# Patient Record
Sex: FEMALE | Race: WHITE | NOT HISPANIC OR LATINO | Employment: FULL TIME | URBAN - METROPOLITAN AREA
[De-identification: names, ages, dates, MRNs, and addresses within clinical notes are randomized per-mention and may not be internally consistent; named-entity substitution may affect disease eponyms.]

---

## 2020-10-04 ENCOUNTER — HOSPITAL ENCOUNTER (OUTPATIENT)
Dept: MRI IMAGING | Facility: CLINIC | Age: 25
Discharge: HOME | End: 2020-10-04
Attending: INTERNAL MEDICINE | Admitting: INTERNAL MEDICINE
Payer: COMMERCIAL

## 2020-10-04 DIAGNOSIS — K83.01 PSC (PRIMARY SCLEROSING CHOLANGITIS) (H): ICD-10-CM

## 2020-10-04 PROCEDURE — 74183 MRI ABD W/O CNTR FLWD CNTR: CPT

## 2020-10-04 PROCEDURE — A9585 GADOBUTROL INJECTION: HCPCS | Performed by: INTERNAL MEDICINE

## 2020-10-04 PROCEDURE — 255N000002 HC RX 255 OP 636: Performed by: INTERNAL MEDICINE

## 2020-10-04 RX ORDER — GADOBUTROL 604.72 MG/ML
10 INJECTION INTRAVENOUS ONCE
Status: COMPLETED | OUTPATIENT
Start: 2020-10-04 | End: 2020-10-04

## 2020-10-04 RX ADMIN — GADOBUTROL 10 ML: 604.72 INJECTION INTRAVENOUS at 14:06

## 2022-01-17 ENCOUNTER — HOSPITAL ENCOUNTER (OUTPATIENT)
Dept: MRI IMAGING | Facility: CLINIC | Age: 27
Discharge: HOME | End: 2022-01-17
Attending: INTERNAL MEDICINE | Admitting: INTERNAL MEDICINE
Payer: COMMERCIAL

## 2022-01-17 DIAGNOSIS — K83.01 PSC (PRIMARY SCLEROSING CHOLANGITIS) (H): ICD-10-CM

## 2022-01-17 PROCEDURE — 74183 MRI ABD W/O CNTR FLWD CNTR: CPT

## 2022-01-17 PROCEDURE — A9585 GADOBUTROL INJECTION: HCPCS | Performed by: INTERNAL MEDICINE

## 2022-01-17 PROCEDURE — 255N000002 HC RX 255 OP 636: Performed by: INTERNAL MEDICINE

## 2022-01-17 RX ORDER — GADOBUTROL 604.72 MG/ML
6 INJECTION INTRAVENOUS ONCE
Status: COMPLETED | OUTPATIENT
Start: 2022-01-17 | End: 2022-01-17

## 2022-01-17 RX ADMIN — GADOBUTROL 6 ML: 604.72 INJECTION INTRAVENOUS at 09:05

## 2022-01-30 ENCOUNTER — HEALTH MAINTENANCE LETTER (OUTPATIENT)
Age: 27
End: 2022-01-30

## 2022-09-24 ENCOUNTER — HEALTH MAINTENANCE LETTER (OUTPATIENT)
Age: 27
End: 2022-09-24

## 2022-11-30 LAB
ABO (EXTERNAL): NORMAL
HEPATITIS B SURFACE ANTIGEN (EXTERNAL): NEGATIVE
HIV1+2 AB SERPL QL IA: NONREACTIVE
RH (EXTERNAL): POSITIVE
RUBELLA ANTIBODY IGG (EXTERNAL): NORMAL
TREPONEMA PALLIDUM ANTIBODY (EXTERNAL): NONREACTIVE
VDRL (SYPHILIS) (EXTERNAL): NONREACTIVE

## 2023-04-08 ENCOUNTER — NURSE TRIAGE (OUTPATIENT)
Dept: NURSING | Facility: CLINIC | Age: 28
End: 2023-04-08

## 2023-04-09 NOTE — TELEPHONE ENCOUNTER
Patient reports the carbon monoxide detector went off in her kitchen and she opened the windows and is standing on her porch.  She denies all symptoms.      Disposition is to call local agency like fire department to come and measure levels and reset her alarm. Patient verbalizes understanding and will let kitchen air out a bit and reset alarm.    Makayla Barton RN  [facility]      Reason for Disposition    [1] SUSPECTED CO EXPOSURE (e.g., CO alarm sounded) AND [2] asymptomatic now (no CO poisoning symptoms)    Additional Information    Negative: [1] Breathing stopped AND [2] hasn't returned    Negative: Bluish (or gray) lips or face now    Negative: Difficult to awaken or acting confused (e.g., disoriented, slurred speech)    Negative: Difficulty breathing    Negative: Chest pain or heaviness (present now)    Negative: Seizure    Negative: Patient attempted suicide    Negative: Sounds like a life-threatening emergency to the triager    Negative: [1] KNOWN CO EXPOSURE (e.g., other victims with CO poisoning) within past 24 hours AND [2] CO poisoning symptoms present now    Negative: [1] SUSPECTED CO EXPOSURE (e.g., CO alarm sounded) within past 24 hours AND [2] CO poisoning symptoms present now    Negative: [1] KNOWN CO EXPOSURE (e.g., other victims with CO poisoning) within past 24 hours AND [2] any CO poisoning symptoms since exposure (but asymptomatic now)    Negative: Triager unable to answer question    Negative: Patient sounds very sick or weak to the triager    Negative: [1] KNOWN CO EXPOSURE (e.g., other victims with CO poisoning) more than 24 hours ago AND [2] CO poisoning symptoms present now    Negative: [1] SUSPECTED CO EXPOSURE (e.g., CO alarm sounded) more than 24 hours agoAND [2] CO poisoning symptoms present now    Protocols used: CARBON MONOXIDE EXPOSURE-A-

## 2023-05-08 ENCOUNTER — HEALTH MAINTENANCE LETTER (OUTPATIENT)
Age: 28
End: 2023-05-08

## 2023-05-27 LAB — GROUP B STREPTOCOCCUS (EXTERNAL): NEGATIVE

## 2023-06-16 ENCOUNTER — HOSPITAL ENCOUNTER (OUTPATIENT)
Facility: CLINIC | Age: 28
End: 2023-06-16
Attending: STUDENT IN AN ORGANIZED HEALTH CARE EDUCATION/TRAINING PROGRAM | Admitting: STUDENT IN AN ORGANIZED HEALTH CARE EDUCATION/TRAINING PROGRAM
Payer: COMMERCIAL

## 2023-07-23 ENCOUNTER — ANESTHESIA EVENT (OUTPATIENT)
Dept: OBGYN | Facility: CLINIC | Age: 28
End: 2023-07-23
Payer: COMMERCIAL

## 2023-07-23 ENCOUNTER — ANESTHESIA (OUTPATIENT)
Dept: OBGYN | Facility: CLINIC | Age: 28
End: 2023-07-23
Payer: COMMERCIAL

## 2023-07-23 ENCOUNTER — HOSPITAL ENCOUNTER (INPATIENT)
Facility: CLINIC | Age: 28
LOS: 2 days | Discharge: HOME | End: 2023-07-25
Attending: SPECIALIST | Admitting: SPECIALIST
Payer: COMMERCIAL

## 2023-07-23 PROBLEM — Z36.89 ENCOUNTER FOR TRIAGE IN PREGNANT PATIENT: Status: ACTIVE | Noted: 2023-07-23

## 2023-07-23 LAB
ABO/RH(D): NORMAL
ANTIBODY SCREEN: NEGATIVE
HGB BLD-MCNC: 13.2 G/DL (ref 11.7–15.7)
SPECIMEN EXPIRATION DATE: NORMAL
T PALLIDUM AB SER QL: NONREACTIVE

## 2023-07-23 PROCEDURE — 722N000001 HC LABOR CARE VAGINAL DELIVERY SINGLE

## 2023-07-23 PROCEDURE — 86780 TREPONEMA PALLIDUM: CPT | Performed by: SPECIALIST

## 2023-07-23 PROCEDURE — 258N000003 HC RX IP 258 OP 636: Performed by: SPECIALIST

## 2023-07-23 PROCEDURE — 250N000013 HC RX MED GY IP 250 OP 250 PS 637: Performed by: OBSTETRICS & GYNECOLOGY

## 2023-07-23 PROCEDURE — 86901 BLOOD TYPING SEROLOGIC RH(D): CPT | Performed by: SPECIALIST

## 2023-07-23 PROCEDURE — 00HU33Z INSERTION OF INFUSION DEVICE INTO SPINAL CANAL, PERCUTANEOUS APPROACH: ICD-10-PCS | Performed by: ANESTHESIOLOGY

## 2023-07-23 PROCEDURE — G0463 HOSPITAL OUTPT CLINIC VISIT: HCPCS

## 2023-07-23 PROCEDURE — 250N000011 HC RX IP 250 OP 636: Performed by: ANESTHESIOLOGY

## 2023-07-23 PROCEDURE — 370N000003 HC ANESTHESIA WARD SERVICE: Performed by: ANESTHESIOLOGY

## 2023-07-23 PROCEDURE — 250N000009 HC RX 250: Performed by: OBSTETRICS & GYNECOLOGY

## 2023-07-23 PROCEDURE — 250N000011 HC RX IP 250 OP 636: Mod: JZ | Performed by: ANESTHESIOLOGY

## 2023-07-23 PROCEDURE — 10907ZC DRAINAGE OF AMNIOTIC FLUID, THERAPEUTIC FROM PRODUCTS OF CONCEPTION, VIA NATURAL OR ARTIFICIAL OPENING: ICD-10-PCS | Performed by: OBSTETRICS & GYNECOLOGY

## 2023-07-23 PROCEDURE — 3E0R3BZ INTRODUCTION OF ANESTHETIC AGENT INTO SPINAL CANAL, PERCUTANEOUS APPROACH: ICD-10-PCS | Performed by: ANESTHESIOLOGY

## 2023-07-23 PROCEDURE — 85018 HEMOGLOBIN: CPT | Performed by: SPECIALIST

## 2023-07-23 PROCEDURE — 120N000012 HC R&B POSTPARTUM

## 2023-07-23 PROCEDURE — 0KQM0ZZ REPAIR PERINEUM MUSCLE, OPEN APPROACH: ICD-10-PCS | Performed by: OBSTETRICS & GYNECOLOGY

## 2023-07-23 PROCEDURE — 86850 RBC ANTIBODY SCREEN: CPT | Performed by: SPECIALIST

## 2023-07-23 PROCEDURE — 36415 COLL VENOUS BLD VENIPUNCTURE: CPT | Performed by: SPECIALIST

## 2023-07-23 RX ORDER — ONDANSETRON 2 MG/ML
4 INJECTION INTRAMUSCULAR; INTRAVENOUS EVERY 6 HOURS PRN
Status: DISCONTINUED | OUTPATIENT
Start: 2023-07-23 | End: 2023-07-23 | Stop reason: HOSPADM

## 2023-07-23 RX ORDER — CITRIC ACID/SODIUM CITRATE 334-500MG
30 SOLUTION, ORAL ORAL
Status: DISCONTINUED | OUTPATIENT
Start: 2023-07-23 | End: 2023-07-23 | Stop reason: HOSPADM

## 2023-07-23 RX ORDER — KETOROLAC TROMETHAMINE 30 MG/ML
30 INJECTION, SOLUTION INTRAMUSCULAR; INTRAVENOUS
Status: DISCONTINUED | OUTPATIENT
Start: 2023-07-23 | End: 2023-07-23

## 2023-07-23 RX ORDER — OXYTOCIN 10 [USP'U]/ML
10 INJECTION, SOLUTION INTRAMUSCULAR; INTRAVENOUS
Status: DISCONTINUED | OUTPATIENT
Start: 2023-07-23 | End: 2023-07-23

## 2023-07-23 RX ORDER — IBUPROFEN 400 MG/1
800 TABLET, FILM COATED ORAL
Status: DISCONTINUED | OUTPATIENT
Start: 2023-07-23 | End: 2023-07-23

## 2023-07-23 RX ORDER — HYDROCORTISONE 25 MG/G
CREAM TOPICAL 3 TIMES DAILY PRN
Status: DISCONTINUED | OUTPATIENT
Start: 2023-07-23 | End: 2023-07-25 | Stop reason: HOSPADM

## 2023-07-23 RX ORDER — METHYLERGONOVINE MALEATE 0.2 MG/ML
200 INJECTION INTRAVENOUS
Status: DISCONTINUED | OUTPATIENT
Start: 2023-07-23 | End: 2023-07-23 | Stop reason: HOSPADM

## 2023-07-23 RX ORDER — OXYTOCIN 10 [USP'U]/ML
10 INJECTION, SOLUTION INTRAMUSCULAR; INTRAVENOUS
Status: DISCONTINUED | OUTPATIENT
Start: 2023-07-23 | End: 2023-07-23 | Stop reason: HOSPADM

## 2023-07-23 RX ORDER — BISACODYL 10 MG
10 SUPPOSITORY, RECTAL RECTAL DAILY PRN
Status: DISCONTINUED | OUTPATIENT
Start: 2023-07-23 | End: 2023-07-25 | Stop reason: HOSPADM

## 2023-07-23 RX ORDER — LIDOCAINE HYDROCHLORIDE AND EPINEPHRINE 15; 5 MG/ML; UG/ML
3 INJECTION, SOLUTION EPIDURAL
Status: DISCONTINUED | OUTPATIENT
Start: 2023-07-23 | End: 2023-07-23 | Stop reason: HOSPADM

## 2023-07-23 RX ORDER — MISOPROSTOL 200 UG/1
800 TABLET ORAL
Status: DISCONTINUED | OUTPATIENT
Start: 2023-07-23 | End: 2023-07-23 | Stop reason: HOSPADM

## 2023-07-23 RX ORDER — SODIUM CHLORIDE, SODIUM LACTATE, POTASSIUM CHLORIDE, CALCIUM CHLORIDE 600; 310; 30; 20 MG/100ML; MG/100ML; MG/100ML; MG/100ML
INJECTION, SOLUTION INTRAVENOUS CONTINUOUS PRN
Status: DISCONTINUED | OUTPATIENT
Start: 2023-07-23 | End: 2023-07-23 | Stop reason: HOSPADM

## 2023-07-23 RX ORDER — FENTANYL CITRATE-0.9 % NACL/PF 10 MCG/ML
100 PLASTIC BAG, INJECTION (ML) INTRAVENOUS EVERY 5 MIN PRN
Status: DISCONTINUED | OUTPATIENT
Start: 2023-07-23 | End: 2023-07-23 | Stop reason: HOSPADM

## 2023-07-23 RX ORDER — MISOPROSTOL 200 UG/1
400 TABLET ORAL
Status: DISCONTINUED | OUTPATIENT
Start: 2023-07-23 | End: 2023-07-25 | Stop reason: HOSPADM

## 2023-07-23 RX ORDER — NALOXONE HYDROCHLORIDE 0.4 MG/ML
0.4 INJECTION, SOLUTION INTRAMUSCULAR; INTRAVENOUS; SUBCUTANEOUS
Status: DISCONTINUED | OUTPATIENT
Start: 2023-07-23 | End: 2023-07-23 | Stop reason: HOSPADM

## 2023-07-23 RX ORDER — METOCLOPRAMIDE HYDROCHLORIDE 5 MG/ML
10 INJECTION INTRAMUSCULAR; INTRAVENOUS EVERY 6 HOURS PRN
Status: DISCONTINUED | OUTPATIENT
Start: 2023-07-23 | End: 2023-07-23 | Stop reason: HOSPADM

## 2023-07-23 RX ORDER — INFLIXIMAB 100 MG/10ML
INJECTION, POWDER, LYOPHILIZED, FOR SOLUTION INTRAVENOUS ONCE
COMMUNITY

## 2023-07-23 RX ORDER — NALBUPHINE HYDROCHLORIDE 20 MG/ML
2.5-5 INJECTION, SOLUTION INTRAMUSCULAR; INTRAVENOUS; SUBCUTANEOUS EVERY 6 HOURS PRN
Status: DISCONTINUED | OUTPATIENT
Start: 2023-07-23 | End: 2023-07-25 | Stop reason: HOSPADM

## 2023-07-23 RX ORDER — OXYTOCIN/0.9 % SODIUM CHLORIDE 30/500 ML
340 PLASTIC BAG, INJECTION (ML) INTRAVENOUS CONTINUOUS PRN
Status: DISCONTINUED | OUTPATIENT
Start: 2023-07-23 | End: 2023-07-23 | Stop reason: HOSPADM

## 2023-07-23 RX ORDER — ONDANSETRON 4 MG/1
4 TABLET, ORALLY DISINTEGRATING ORAL EVERY 6 HOURS PRN
Status: DISCONTINUED | OUTPATIENT
Start: 2023-07-23 | End: 2023-07-23 | Stop reason: HOSPADM

## 2023-07-23 RX ORDER — OXYTOCIN/0.9 % SODIUM CHLORIDE 30/500 ML
340 PLASTIC BAG, INJECTION (ML) INTRAVENOUS CONTINUOUS PRN
Status: DISCONTINUED | OUTPATIENT
Start: 2023-07-23 | End: 2023-07-25 | Stop reason: HOSPADM

## 2023-07-23 RX ORDER — SODIUM CHLORIDE, SODIUM LACTATE, POTASSIUM CHLORIDE, CALCIUM CHLORIDE 600; 310; 30; 20 MG/100ML; MG/100ML; MG/100ML; MG/100ML
INJECTION, SOLUTION INTRAVENOUS CONTINUOUS
Status: DISCONTINUED | OUTPATIENT
Start: 2023-07-23 | End: 2023-07-23 | Stop reason: HOSPADM

## 2023-07-23 RX ORDER — ACETAMINOPHEN 325 MG/1
650 TABLET ORAL EVERY 4 HOURS PRN
Status: DISCONTINUED | OUTPATIENT
Start: 2023-07-23 | End: 2023-07-23 | Stop reason: HOSPADM

## 2023-07-23 RX ORDER — NALOXONE HYDROCHLORIDE 0.4 MG/ML
0.2 INJECTION, SOLUTION INTRAMUSCULAR; INTRAVENOUS; SUBCUTANEOUS
Status: DISCONTINUED | OUTPATIENT
Start: 2023-07-23 | End: 2023-07-23 | Stop reason: HOSPADM

## 2023-07-23 RX ORDER — FENTANYL CITRATE 50 UG/ML
100 INJECTION, SOLUTION INTRAMUSCULAR; INTRAVENOUS
Status: DISCONTINUED | OUTPATIENT
Start: 2023-07-23 | End: 2023-07-23 | Stop reason: HOSPADM

## 2023-07-23 RX ORDER — MODIFIED LANOLIN
OINTMENT (GRAM) TOPICAL
Status: DISCONTINUED | OUTPATIENT
Start: 2023-07-23 | End: 2023-07-25 | Stop reason: HOSPADM

## 2023-07-23 RX ORDER — CARBOPROST TROMETHAMINE 250 UG/ML
250 INJECTION, SOLUTION INTRAMUSCULAR
Status: DISCONTINUED | OUTPATIENT
Start: 2023-07-23 | End: 2023-07-25 | Stop reason: HOSPADM

## 2023-07-23 RX ORDER — PROCHLORPERAZINE MALEATE 5 MG
10 TABLET ORAL EVERY 6 HOURS PRN
Status: DISCONTINUED | OUTPATIENT
Start: 2023-07-23 | End: 2023-07-23 | Stop reason: HOSPADM

## 2023-07-23 RX ORDER — METOCLOPRAMIDE 10 MG/1
10 TABLET ORAL EVERY 6 HOURS PRN
Status: DISCONTINUED | OUTPATIENT
Start: 2023-07-23 | End: 2023-07-23 | Stop reason: HOSPADM

## 2023-07-23 RX ORDER — OXYTOCIN 10 [USP'U]/ML
10 INJECTION, SOLUTION INTRAMUSCULAR; INTRAVENOUS
Status: DISCONTINUED | OUTPATIENT
Start: 2023-07-23 | End: 2023-07-25 | Stop reason: HOSPADM

## 2023-07-23 RX ORDER — DOCUSATE SODIUM 100 MG/1
100 CAPSULE, LIQUID FILLED ORAL DAILY
Status: DISCONTINUED | OUTPATIENT
Start: 2023-07-23 | End: 2023-07-25 | Stop reason: HOSPADM

## 2023-07-23 RX ORDER — MISOPROSTOL 200 UG/1
800 TABLET ORAL
Status: DISCONTINUED | OUTPATIENT
Start: 2023-07-23 | End: 2023-07-25 | Stop reason: HOSPADM

## 2023-07-23 RX ORDER — METHYLERGONOVINE MALEATE 0.2 MG/ML
200 INJECTION INTRAVENOUS
Status: DISCONTINUED | OUTPATIENT
Start: 2023-07-23 | End: 2023-07-25 | Stop reason: HOSPADM

## 2023-07-23 RX ORDER — ROPIVACAINE HYDROCHLORIDE 2 MG/ML
INJECTION, SOLUTION EPIDURAL; INFILTRATION; PERINEURAL
Status: COMPLETED | OUTPATIENT
Start: 2023-07-23 | End: 2023-07-23

## 2023-07-23 RX ORDER — IBUPROFEN 400 MG/1
800 TABLET, FILM COATED ORAL EVERY 6 HOURS PRN
Status: DISCONTINUED | OUTPATIENT
Start: 2023-07-23 | End: 2023-07-25 | Stop reason: HOSPADM

## 2023-07-23 RX ORDER — TRANEXAMIC ACID 10 MG/ML
1 INJECTION, SOLUTION INTRAVENOUS EVERY 30 MIN PRN
Status: DISCONTINUED | OUTPATIENT
Start: 2023-07-23 | End: 2023-07-23 | Stop reason: HOSPADM

## 2023-07-23 RX ORDER — ACETAMINOPHEN 325 MG/1
650 TABLET ORAL EVERY 4 HOURS PRN
Status: DISCONTINUED | OUTPATIENT
Start: 2023-07-23 | End: 2023-07-25 | Stop reason: HOSPADM

## 2023-07-23 RX ORDER — OXYTOCIN/0.9 % SODIUM CHLORIDE 30/500 ML
100-340 PLASTIC BAG, INJECTION (ML) INTRAVENOUS CONTINUOUS PRN
Status: DISCONTINUED | OUTPATIENT
Start: 2023-07-23 | End: 2023-07-23

## 2023-07-23 RX ORDER — CARBOPROST TROMETHAMINE 250 UG/ML
250 INJECTION, SOLUTION INTRAMUSCULAR
Status: DISCONTINUED | OUTPATIENT
Start: 2023-07-23 | End: 2023-07-23 | Stop reason: HOSPADM

## 2023-07-23 RX ORDER — PROCHLORPERAZINE 25 MG
25 SUPPOSITORY, RECTAL RECTAL EVERY 12 HOURS PRN
Status: DISCONTINUED | OUTPATIENT
Start: 2023-07-23 | End: 2023-07-23 | Stop reason: HOSPADM

## 2023-07-23 RX ORDER — MISOPROSTOL 200 UG/1
400 TABLET ORAL
Status: DISCONTINUED | OUTPATIENT
Start: 2023-07-23 | End: 2023-07-23 | Stop reason: HOSPADM

## 2023-07-23 RX ORDER — LIDOCAINE 40 MG/G
CREAM TOPICAL
Status: DISCONTINUED | OUTPATIENT
Start: 2023-07-23 | End: 2023-07-23 | Stop reason: HOSPADM

## 2023-07-23 RX ORDER — OXYTOCIN/0.9 % SODIUM CHLORIDE 30/500 ML
1-24 PLASTIC BAG, INJECTION (ML) INTRAVENOUS CONTINUOUS
Status: DISCONTINUED | OUTPATIENT
Start: 2023-07-23 | End: 2023-07-23 | Stop reason: HOSPADM

## 2023-07-23 RX ORDER — TRANEXAMIC ACID 10 MG/ML
1 INJECTION, SOLUTION INTRAVENOUS EVERY 30 MIN PRN
Status: DISCONTINUED | OUTPATIENT
Start: 2023-07-23 | End: 2023-07-25 | Stop reason: HOSPADM

## 2023-07-23 RX ORDER — ROPIVACAINE HYDROCHLORIDE 2 MG/ML
10 INJECTION, SOLUTION EPIDURAL; INFILTRATION; PERINEURAL ONCE
Status: DISCONTINUED | OUTPATIENT
Start: 2023-07-23 | End: 2023-07-23 | Stop reason: HOSPADM

## 2023-07-23 RX ORDER — PRENATAL VIT/IRON FUM/FOLIC AC 27MG-0.8MG
TABLET ORAL DAILY
COMMUNITY

## 2023-07-23 RX ADMIN — ROPIVACAINE HYDROCHLORIDE 10 ML: 2 INJECTION, SOLUTION EPIDURAL; INFILTRATION at 07:27

## 2023-07-23 RX ADMIN — SODIUM CHLORIDE, POTASSIUM CHLORIDE, SODIUM LACTATE AND CALCIUM CHLORIDE 1000 ML: 600; 310; 30; 20 INJECTION, SOLUTION INTRAVENOUS at 06:20

## 2023-07-23 RX ADMIN — Medication 2 MILLI-UNITS/MIN: at 08:39

## 2023-07-23 RX ADMIN — ACETAMINOPHEN 650 MG: 325 TABLET, FILM COATED ORAL at 18:03

## 2023-07-23 RX ADMIN — SODIUM CHLORIDE, POTASSIUM CHLORIDE, SODIUM LACTATE AND CALCIUM CHLORIDE: 600; 310; 30; 20 INJECTION, SOLUTION INTRAVENOUS at 12:07

## 2023-07-23 RX ADMIN — IBUPROFEN 800 MG: 400 TABLET ORAL at 18:03

## 2023-07-23 RX ADMIN — Medication: at 07:18

## 2023-07-23 ASSESSMENT — ACTIVITIES OF DAILY LIVING (ADL)
ADLS_ACUITY_SCORE: 20

## 2023-07-23 NOTE — PLAN OF CARE
Data: Patient presented to Birthplace: 2023  5:40 AM.  Reason for maternal/fetal assessment is uterine contractions. Patient reports uterine contractions starting around 0000. Patient denies leaking of vaginal fluid/rupture of membranes, vaginal bleeding, abdominal pain, pelvic pressure, nausea, vomiting, headache, visual disturbances, epigastric or RUQ pain, significant edema. Patient reports fetal movement is normal. Patient is a 40w5d .  Prenatal record reviewed. Pregnancy has been uncomplicated.    Vital signs wnl. Support person is present.     Action: Verbal consent for EFM. Triage assessment completed.     Response: Patient verbalized agreement with plan. Will contact Dr. Harry with update and further orders.

## 2023-07-23 NOTE — ANESTHESIA PREPROCEDURE EVALUATION
Anesthesia Pre-Procedure Evaluation    Patient: Mariaa Mann   MRN: [id number] : 1995        Procedure : * No procedures listed *          Past Medical History:   Diagnosis Date     Crohn's disease (H)       History reviewed. No pertinent surgical history.   Allergies   Allergen Reactions     Ceclor [Cefaclor] Hives     Zosyn [Piperacillin Sod-Tazobactam So] Swelling      Social History     Tobacco Use     Smoking status: Never     Smokeless tobacco: Never   Substance Use Topics     Alcohol use: Not Currently      Wt Readings from Last 1 Encounters:   23 69.9 kg (154 lb)        Anesthesia Evaluation            ROS/MED HX  ENT/Pulmonary:    (-) asthma   Neurologic:       Cardiovascular:    (-) PIH   METS/Exercise Tolerance:     Hematologic:     (+) no anticoagulation therapy, no coagulopathy,     Musculoskeletal:   (+) scoliosis,     GI/Hepatic: Comment: Crohn's disease - managed with Remicade    (+) GERD,     Renal/Genitourinary:       Endo:       Psychiatric/Substance Use:       Infectious Disease:       Malignancy:       Other:            Physical Exam    Airway        Mallampati: II   TM distance: > 3 FB   Neck ROM: full     Respiratory Devices and Support         Dental  no notable dental history         Cardiovascular   cardiovascular exam normal          Pulmonary   pulmonary exam normal                OUTSIDE LABS:  CBC:   Lab Results   Component Value Date    HGB 13.2 2023     BMP: No results found for: NA, POTASSIUM, CHLORIDE, CO2, BUN, CR, GLC  COAGS: No results found for: PTT, INR, FIBR  POC: No results found for: BGM, HCG, HCGS  HEPATIC: No results found for: ALBUMIN, PROTTOTAL, ALT, AST, GGT, ALKPHOS, BILITOTAL, BILIDIRECT, DRAGAN  OTHER: No results found for: PH, LACT, A1C, LUISA, PHOS, MAG, LIPASE, AMYLASE, TSH, T4, T3, CRP, SED    Anesthesia Plan    ASA Status:  2      Anesthesia Type: Epidural.              Consents    Anesthesia Plan(s) and associated risks, benefits, and  realistic alternatives discussed. Questions answered and patient/representative(s) expressed understanding.    - Discussed:     - Discussed with:  Patient         Postoperative Care            Comments:    Other Comments: Orders to manage the epidural infusion have been entered, and through coordination with the nurse, we will continute to manage and monitor the patient's labor epidural.  We will continuously be available to adjust as needed thruout the entire L&D process.             Randy Person MD

## 2023-07-23 NOTE — L&D DELIVERY NOTE
OB Vaginal Delivery Note    Mariaa Mann MRN# [id number]   Age: 28 year old YOB: 1995     GA: 40w5d  GP:   Labor Complications: None   Delivery QBL: 445 mL  Delivery Type: Vaginal, Spontaneous   ROM to Delivery Time: (Delivered) Hours: 6 Minutes: 28  [place] Weight:  pending   1 Minute 5 Minute 10 Minute   Apgar Totals: 8   9        LYNDSEY PLATA     Delivery Details:  Mariaa Mann, a 28 year old  female delivered a viable infant with apgars of 8  and 9 . Patient was admitted at 5cm on admission and painfully john. Patient got an epidural and then AROM and pitocin. She was fully dilated and +2 station at 245pm. Delivery was via vaginal, spontaneous  to a sterile field under epidural  anesthesia. Infant delivered in vertex  right  occiput  anterior  position. Anterior and posterior shoulders delivered without difficulty. The cord was clamped, cut twice after 60 seconds and 3 vessels  were noted. Cord blood was obtained in routine fashion with the following disposition: discard .      Cord complications: none   Placenta delivered at 2023  3:53 PM . Placental disposition was Hospital disposal . Fundal massage performed and fundus found to be firm.     Episiotomy: none    Perineum, vagina, cervix were inspected, and the following lacerations were noted:   Perineal lacerations: 2nd     Any lacerations were repaired in the usual fashion using 2-0 and vicryl 3-0.    Excellent hemostasis was noted. Needle count correct. Infant and patient in delivery room in good and stable condition.        Johnathan Female-Mariaa [[id number]]    Labor Event Times    Dilation complete date: 23 Complete time:  2:50 PM   Start pushing date/time: 2023 1454   Decision date/time (emergent ): 2023 1553      Labor Events     labor?: No   steroids: None  Labor Type: Spontaneous, AROM  Predominate monitoring during 1st stage: continuous  electronic fetal monitoring     Antibiotics received during labor?: No     Rupture date/time: 23 0925   Rupture type: Artificial Rupture of Membranes  Fluid color: Clear  Fluid odor: Normal     Augmentation: Oxytocin, AROM  Indications for augmentation: Ineffective Contraction Pattern     Delivery/Placenta Date and Time    Delivery Date: 23 Delivery Time:  3:53 PM   Placenta Date/Time: 2023  3:53 PM  Oxytocin given at the time of delivery: after delivery of placenta  Delivering clinician: Suze Farrar,    Other personnel present at delivery:  Provider Role   Bella Plata RN          Vaginal Counts     Initial count performed by 2 team members:  Two Team Members   DELVIN FARRAR       Needles Suture Needles Sponges (RETIRED) Instruments   Initial counts       Added to count       Relief counts       Final counts             Placed during labor Accounted for at the end of labor   FSE No    IUPC No    Cervidil No               Final count performed by 2 team members:  Two Team Members   DELVIN FARRAR MD      Final count correct?: Yes  Pre-Birth Team Brief: Complete  Post-Birth Team Debrief: Complete     Apgars    Living status: Living   1 Minute 5 Minute 10 Minute 15 Minute 20 Minute   Skin color: 0  1       Heart rate: 2  2       Reflex irritability: 2  2       Muscle tone: 2  2       Respiratory effort: 2  2       Total: 8  9       Apgars assigned by: BLAS PLATA RN     Cord    Vessels: 3 Vessels    Cord Complications: None               Cord Blood Disposition: Discard    Gases Sent?: No    Delayed cord clamping?: Yes    Cord Clamping Delay (seconds):  seconds    Stem cell collection?: No        Resuscitation    Methods: None  Output in Delivery Room: Voided, Stool      Measurements    Output in delivery room: Voided, Stool     Skin to Skin and Feeding Plan    Skin to skin initiation date/time: 1841    Skin to skin with: Mother  Skin to skin end  date/time:        Labor Events and Shoulder Dystocia    Fetal Tracing Prior to Delivery: Category 1  Fetal Tracing Comments: a few variables but mainly cat 1   Shoulder dystocia present?: Neg     Delivery (Maternal) (Provider to Complete) ([id number])    Episiotomy: None  Perineal lacerations: 2nd Repaired?: Yes   Repair suture: 2-0 Vicryl, 3-0 Vicryl  Genital tract inspection done: Pos     Blood Loss  Mother: Mariaa Mann #[id number]   Start of Mother's Information    Delivery Blood Loss  07/23/23 0353 - 07/23/23 1651    Delivery QBL (mL) Hospital Encounter 445 mL    Total  445 mL         End of Mother's Information  Mother: Mariaa Mann #[id number]          Delivery - Provider to Complete ([id number])    Delivering clinician: Suze Penn DO  Delivery Type (Choose the 1 that will go to the Birth History): Vaginal, Spontaneous                   Other personnel:  Provider Role   Bella Soria RN                 Placenta    Date/Time: 7/23/2023  3:53 PM  Removal: Spontaneous  Disposition: Hospital disposal           Anesthesia    Method: Epidural  Cervical dilation at placement: 4-7                Presentation and Position    Presentation: Vertex    Position: Right Occiput Anterior                 Suze Penn DO

## 2023-07-23 NOTE — ANESTHESIA PROCEDURE NOTES
"Epidural catheter Procedure Note    Pre-Procedure   Staff -        Anesthesiologist:  Randy Person MD       Performed By: Anesthesiologist       Location: OB       Procedure Start/Stop Times: 7/23/2023 7:11 AM and 7/23/2023 7:27 AM       Pre-Anesthestic Checklist: patient identified, IV checked, site marked, risks and benefits discussed, informed consent, monitors and equipment checked and pre-op evaluation  Timeout:       Correct Patient: Yes        Correct Procedure: Yes        Correct Site: Yes        Correct Position: Yes   Procedure Documentation  Procedure: epidural catheter       Patient Position: sitting       Patient Prep/Sterile Barriers: sterile gloves, mask, patient draped       Skin prep: Betadine       Local skin infiltrated with 3 mL of 1% lidocaine.        Insertion Site: L3-4. (midline approach).       Technique: LORT air        Needle Type: Advanced Oncotherapyy needle       Needle Gauge: 17.        Needle Length (Inches): 3.5        Catheter: 19 G.          Catheter threaded easily.         3.5 cm epidural space.           # of attempts: 1 and  # of redirects:  0    Assessment/Narrative         Paresthesias: No.       Test dose of 3 mL lidocaine 1.5% w/ 1:200,000 epinephrine at 07:27 CDT.         Test dose negative, 3 minutes after injection, for signs of intravascular, subdural, or intrathecal injection.       Insertion/Infusion Method: LORT air       Aspiration negative for Heme or CSF via Epidural Catheter.    Medication(s) Administered   0.2% Ropivacaine (Epidural) - EPIDURAL   10 mL - 7/23/2023 7:27:00 AM  Medication Administration Time: 7/23/2023 7:11 AM     Comments:  Pt tolerated procedure well.   No complications.    The epidural was bolused with 10 ml of 0.2% Ropivacaine in incremental doses and intermittent negative aspiration after negative test dose.         FOR [facility] ([facility]) ONLY:   Pain Team Contact information: please page the Pain Team Via [employer]. Search \"Pain\". During daytime hours, " please page the attending first. At night please page the resident first.

## 2023-07-23 NOTE — PLAN OF CARE
Assumed care of patient at approx 0715. Pt sitting at bedside completing epidural placement. Spouse at bedside. Pt positioned to left tilt, then right side. Gold cath placed. SVE 5.5/80/-2, membranes intact. Dr Penn updated. Orders received to initiate pitocin augmentation for ineffective contraction pattern. Pitocin started with pt consent and MD order. Pt resting. FHTs category 1. UCs 2-8 min. Cont to monitor and assess.

## 2023-07-23 NOTE — PROVIDER NOTIFICATION
07/23/23 0556   Provider Notification   Provider Name/Title Dr. Harry   Method of Notification Electronic Page   Request Evaluate - Remote   Notification Reason SVE;Status Update;Patient Arrived     SVE 5.5/80/-2; uncomfortable with contractions. Jocelyn every 3-4 min.     Plan to admit.

## 2023-07-23 NOTE — PROGRESS NOTES
OB Progress Note     S: Patient is starting to feel some rectal pressure but overall comfortable with epidural in place.     O:   Vitals:    23 1240 23 1300 23 1315 23 1330   BP:  103/58  115/66   BP Location:       Patient Position:       Resp:       Temp: 97  F (36.1  C)      TempSrc: Temporal      SpO2:   97% 97%   Weight:       Height:         Cervix: 9.5/100/-1     Fetal Heart Tracin-120bpm baseline, mod variability, 15x15 accelerations, no decelerations   [place]: q 2-4 min     A/P:   Mariaacriss Mann is a 28 year old  at 40w5d here for  labor    Labor  -s/p AROM and on pitocin currently at 4  -SVE at 230-245 and then will start pushing      Marginal cord insertion   -caution with delivery of placenta       Electronically Signed by  Suze Penn DO  2023  [facility]  [facility]

## 2023-07-23 NOTE — H&P
"OB Admit    29 yo G1 female currently at 40w5d EDC 2023 by 7 wk ultrasound who presented earlier this morning with regular painful contractions.  She was last 2 cm in clinic and was 5 cm upon arrival.  GBS neg.  Membranes intact.  She was admitted and requested and received an epidural.  She is now comfortable.  Pitocin was started after epidural placement as contractions spaced out, currently at 4 mu/min.    Preg risks  1) Crohn's disease - on Remicade and infusions held after 32 weeks    2) Prepreg BMI 18    3) Marginal cord insertion - normal fetal growth at 32 weeks    4) Primary sclerosing cholangitis - no meds - followed by [facility]    S/p tdap  Rh pos  Rub immune  RPR NR  GBS neg    PMH As above   Hospitalized for crohn's flair in  - annual colonoscopies (last done 10/2022)  PSH Catheterized for repair of \"hole in heart\" when she was around 10 yo - no further cardiac follow up needed  Meds PNV  All Ceclor - hives   Zosyn - joint swelling (age 10 yo)   Animal dander  Soc , nonsmoker    PE /58   Temp 97.2  F (36.2  C) (Temporal)   Resp 16   Ht 1.778 m (5' 10\")   Wt 69.9 kg (154 lb)   SpO2 97%   BMI 22.10 kg/m    Gen alert appears comfortable NAD  Abd soft/gravid/NT  Extr neg    FHT cat 1 tracing reactive  toco irreg w/some coupling q2-5 min  SVE 6-7/90/soft/ant/-1 vtx well applied OT  AROM at 0925 am mod clear fluid  Pelvic feels adequate    Hemoglobin   Date Value Ref Range Status   2023 13.2 11.7 - 15.7 g/dL Final   ]  Imp: Nulliparous female at term here in spontaneous labor with some progress, now comfortable with epidural.  On low dose pitocin for irregular contractions.  GBS neg.  Cat 1 tracing.      Plan: Dr. Penn on call today and updated.  CPM.  If contraction pattern improves may not need to increase pitocin - will monitor.  Anticipate .    "

## 2023-07-24 PROCEDURE — 120N000012 HC R&B POSTPARTUM

## 2023-07-24 PROCEDURE — 250N000013 HC RX MED GY IP 250 OP 250 PS 637: Performed by: OBSTETRICS & GYNECOLOGY

## 2023-07-24 RX ADMIN — IBUPROFEN 800 MG: 400 TABLET ORAL at 06:39

## 2023-07-24 RX ADMIN — ACETAMINOPHEN 650 MG: 325 TABLET, FILM COATED ORAL at 12:20

## 2023-07-24 RX ADMIN — IBUPROFEN 800 MG: 400 TABLET ORAL at 18:30

## 2023-07-24 RX ADMIN — IBUPROFEN 800 MG: 400 TABLET ORAL at 12:20

## 2023-07-24 RX ADMIN — ACETAMINOPHEN 650 MG: 325 TABLET, FILM COATED ORAL at 18:30

## 2023-07-24 RX ADMIN — DOCUSATE SODIUM 100 MG: 100 CAPSULE, LIQUID FILLED ORAL at 09:57

## 2023-07-24 RX ADMIN — ACETAMINOPHEN 650 MG: 325 TABLET, FILM COATED ORAL at 00:59

## 2023-07-24 RX ADMIN — ACETAMINOPHEN 650 MG: 325 TABLET, FILM COATED ORAL at 06:39

## 2023-07-24 RX ADMIN — IBUPROFEN 800 MG: 400 TABLET ORAL at 00:59

## 2023-07-24 ASSESSMENT — ACTIVITIES OF DAILY LIVING (ADL)
ADLS_ACUITY_SCORE: 20

## 2023-07-24 NOTE — PROGRESS NOTES
POSTPARTUM DAY 1 NOTE s/p     SUBJECTIVE:  Doing great except for extreme exhaustion.  Barely got any sleep last night as baby cluster fed all night.  Feeding now again.  Otherwise, pain controlled.  Ambulating and voiding.  Lochia wnl.  Denies mood concerns.  Not ready for discharge due to fatigue.    OBJECTIVE:  Vitals:    23 0130 23 0245 23 0601 23 0639   BP:  117/83 126/87    BP Location:  Right arm Right arm    Patient Position:       Pulse:  55 57    Resp: 16 16 16 16   Temp:       TempSrc:       SpO2:       Weight:       Height:         PHYSICAL EXAM:  A&OX3 NAD  S, moderate appropriate tenderness, nd, ff and below umbilicus  Ext: trace edema bilat, no calf tenderness    No lab results found.    Invalid input(s): SODIUM, SI2SDIOY, CALCIUM    Recent Labs   Lab Test 23  0608   HGB 13.2       Patient Active Problem List    Diagnosis Date Noted    Encounter for triage in pregnant patient 2023     Priority: Medium    Indication for care in labor or delivery 2023     Priority: Medium           ASSESSMENT & PLAN:   1. PPD# 1.  Doing well overall.  Routine Care.   2. Dispo-likely discharge home tomorrow.    Electronically signed by Zainab Macias MD  Call [phone] to have me paged or find out who's tono [facility]  [facility]   2023  8:50 AM

## 2023-07-24 NOTE — PLAN OF CARE
Goal Outcome Evaluation:  Pt, support person, and infant transferred to unit at 1916 accompanied by labor RN.  ID bands double verified.  Pt oriented to room and call light placed within reach.  Safety protocols including band checks, safe sleep practices, and bulb syringe use reviewed. Pt verbally acknowledged understanding of teaching. Encouraged to call w/questions or concerns. VSS on RA.  Fundus firm, midline, U/ 1 .  Scant lochia rubra. Pt is able to empty bladder independently. Voiding adequately.  Up independently.  Pain managed w/Tylenol and Ibuprofen.  Pt breastfeeding independently, infant cluster feeding at times.  Bonding well w/infant.  Independent w/infant cares.  Spouse supportive at bedside.  Nursing to continue to monitor.

## 2023-07-24 NOTE — PLAN OF CARE
Data: Mariaa Mann transferred to room 409 via wheelchair at 1900. Baby transferred via parent's arms.  Action: Receiving unit notified of transfer: Yes. Patient and family notified of room change. Report given to HOSEA Thao at 1900. Belongings sent to receiving unit. Accompanied by Registered Nurse. Oriented patient to surroundings. Call light within reach. ID bands double-checked with receiving RN.  Response: Patient tolerated transfer and is stable.

## 2023-07-25 VITALS
SYSTOLIC BLOOD PRESSURE: 115 MMHG | DIASTOLIC BLOOD PRESSURE: 83 MMHG | BODY MASS INDEX: 20.83 KG/M2 | HEART RATE: 58 BPM | RESPIRATION RATE: 16 BRPM | WEIGHT: 145.5 LBS | OXYGEN SATURATION: 100 % | HEIGHT: 70 IN | TEMPERATURE: 97.6 F

## 2023-07-25 PROCEDURE — 250N000013 HC RX MED GY IP 250 OP 250 PS 637: Performed by: OBSTETRICS & GYNECOLOGY

## 2023-07-25 RX ORDER — IBUPROFEN 200 MG
600-800 TABLET ORAL EVERY 6 HOURS PRN
COMMUNITY
Start: 2023-07-25

## 2023-07-25 RX ORDER — ACETAMINOPHEN 500 MG
1000 TABLET ORAL EVERY 6 HOURS PRN
COMMUNITY
Start: 2023-07-25

## 2023-07-25 RX ADMIN — ACETAMINOPHEN 650 MG: 325 TABLET, FILM COATED ORAL at 00:56

## 2023-07-25 RX ADMIN — ACETAMINOPHEN 650 MG: 325 TABLET, FILM COATED ORAL at 06:32

## 2023-07-25 RX ADMIN — IBUPROFEN 800 MG: 400 TABLET ORAL at 06:32

## 2023-07-25 RX ADMIN — IBUPROFEN 800 MG: 400 TABLET ORAL at 00:56

## 2023-07-25 RX ADMIN — DOCUSATE SODIUM 100 MG: 100 CAPSULE, LIQUID FILLED ORAL at 08:35

## 2023-07-25 ASSESSMENT — ACTIVITIES OF DAILY LIVING (ADL)
ADLS_ACUITY_SCORE: 20

## 2023-07-25 NOTE — DISCHARGE SUMMARY
[facility] Discharge Summary    Mariaa Mann   Age: 28 year old MRN:[id number]  :1995         Date of Admission:  2023  Date of Discharge::  2023  Admitting Physician:   Paola Harry MD  Discharge Physician:  ALESSANDRA BAINS DO     Home clinic: [facility]          Admission Diagnoses:   Encounter for triage in pregnant patient [Z36.89]  Indication for care in labor or delivery [O75.9]          Discharge Diagnosis:     Normal spontaneous vaginal delivery          Procedures:     Procedure(s): Normal delivery       No other procedures performed during this admission           Medications Prior to Admission:     Medications Prior to Admission   Medication Sig Dispense Refill Last Dose    inFLIXimab (REMICADE) 100 MG injection once Last administered at 32 weeks of pregnancy   More than a month    Prenatal Vit-Fe Fumarate-FA (PRENATAL MULTIVITAMIN W/IRON) 27-0.8 MG tablet Take by mouth daily                Discharge Medications:     Current Discharge Medication List        START taking these medications    Details   acetaminophen (TYLENOL) 500 MG tablet Take 2 tablets (1,000 mg) by mouth every 6 hours as needed for mild pain or fever (greater than or equal to 38  C /100.4  F (oral) or 38.5  C/ 101.4  F (core).)    Associated Diagnoses: Normal delivery      ibuprofen (ADVIL/MOTRIN) 200 MG tablet Take 3-4 tablets (600-800 mg) by mouth every 6 hours as needed for other (cramping)    Associated Diagnoses: Normal delivery           CONTINUE these medications which have NOT CHANGED    Details   inFLIXimab (REMICADE) 100 MG injection once Last administered at 32 weeks of pregnancy      Prenatal Vit-Fe Fumarate-FA (PRENATAL MULTIVITAMIN W/IRON) 27-0.8 MG tablet Take by mouth daily                   Consultations:   No consultations were requested during this admission          Brief History of Labor:   Pt admitted in active labor, received epidural, had normal vaginal delivery.             Hospital Course:   The patient's hospital course was unremarkable.  On discharge, her pain was well controlled. Vaginal bleeding is similar to peak menstrual flow.  Voiding without difficulty.  Ambulating well and tolerating a normal diet.  No fever.  Breastfeeding well.  Infant is stable.  No bowel movement yet.*  She was discharged on post-partum day #2.    Post-partum hemoglobin:   Hemoglobin   Date Value Ref Range Status   07/23/2023 13.2 11.7 - 15.7 g/dL Final            Discharge Instructions and Follow-Up:     Discharge diet: Regular   Discharge activity: Nothing in Vagina for 6 weeks - no intercourse, tampons.  Resume normal activities     Discharge follow-up: RTC Dr Penn in 6 weeks for PP check           Discharge Disposition:     Discharged to home      Electronically signed by  Frannie Ruth DO  [phone]  9:01 AM  7/25/2023  [facility]

## 2023-07-25 NOTE — PLAN OF CARE
Goal Outcome Evaluation:      Plan of Care Reviewed With: patient, spouse    Overall Patient Progress: improvingOverall Patient Progress: improving    Vital signs stable. Postpartum assessment WDL. Pain controlled with Tylenol and Ibuprofen. Patient voiding without difficulty. Breastfeeding on cue with no assist. Patient and infant bonding well. Will continue with current plan of care.

## 2023-07-25 NOTE — PROGRESS NOTES
"Postpartum Day 2: Vaginal Delivery    S:The patient feels well.  Slept better last nigth/ this am. Pain is well controlled with current medications.  Bottom feels okay. She is  ambulating,  voiding without difficulty. The baby is well and she is breast feeding. Bleeding is  moderate. The patient has no emotional concerns. Denies headache, chest pain, shortness of breath, nausea or vomitting.    O:  /83 (BP Location: Right arm, Patient Position: Semi-Murdock's)   Pulse 58   Temp 97.6  F (36.4  C) (Oral)   Resp 16   Ht 1.778 m (5' 10\")   Wt 69.9 kg (154 lb)   SpO2 100%   Breastfeeding Unknown   BMI 22.10 kg/m     Genl: AAOx3m NAD  Abd: Soft, NT, Fundus firm  Ext: NT, no  edema    Impression:28 year old y.o.  PPD#2 s/p .  Normal postpartum course.     Plan: Discharge home. Return to clinic in 4-6 weeks.    Electronically signed by  Frannie Ruth DO  [phone]  8:41 AM  2023  [facility]     "

## 2023-07-25 NOTE — PLAN OF CARE
Goal Outcome Evaluation:  VSS on RA.  Fundus firm, midline, U/ 1 .  Scant lochia rubra. Pt is able to empty bladder independently. Voiding adequately.  Up independently.  Pain managed w/Tylenol and Ibuprofen.  Pt breastfeeding independently, infant cluster-feeding.  Bonding well w/infant.  Independent w/infant cares.  Spouse supportive at bedside.  Nursing to continue to monitor.

## 2023-07-25 NOTE — LACTATION NOTE
Lactation visit with Mariaa, JOE, and baby girl. Getting ready for discharge. Mariaa reports feeding is going ok. Baby is nursing on the right breast. Deep latch with swallows noted. Discussed what a deep latch looks and feels like. Discussed nipple shape immediately after baby unlatches. Discussed cluster feeding, what it is and when to expect it, The Second Night, satiety cues, feeding cues, and reviewed Feeding Log for home use. Encouraged to review Breastfeeding section in Your Guide to Postpartum &  Care.    Reviewed milk supply and engorgement. Reviewed typical timeline of milk supply initiation and progression over first 3-5 days postpartum. Discussed comfort measures for engorgement, plugged duct treatment, and warning signs of breast infection. General questions answered regarding pumping, when it's helpful and necessary. Reviewed general recommendation to wait to start pumping until breastfeeding is well established unless there are feeding difficulties or engorgement not relieved by feeding baby or hand expression. Discussed introducing a bottle and recommendation to wait for bottle introduction for 3-4 weeks unless baby needs to supplement for medical reasons.    Feeding plan: Recommend unlimited, frequent breast feedings: At least 8 - 12 times every 24 hours. Avoid pacifiers and supplementation with formula unless medically indicated. Encouraged use of feeding log and to record feedings, and void/stool patterns. Mariaa has a breast pump for home use. Follow up with [facility]. Reviewed outpatient lactation resource.    Sisi Hart RN, IBCLC

## 2023-07-25 NOTE — DISCHARGE INSTRUCTIONS
Postpartum Vaginal Delivery Instructions    Activity     Ask family and friends for help when you need it.  Do not place anything in your vagina for 6 weeks.  You are not restricted on other activities, but take it easy for a few weeks to allow your body to recover from delivery.  You are able to do any activities you feel up to that point.  No driving until you have stopped taking your pain medications (usually two weeks after delivery).     Call your health care provider if you have any of these symptoms:     Increased pain, swelling, redness, or fluid around your stiches from an episiotomy or perineal tear.  A fever above 100.4 F (38 C) with or without chills when placing a thermometer under your tongue.  You soak a sanitary pad with blood within 1 hour, or you see blood clots larger than a golf ball.  Bleeding that lasts more than 6 weeks.  Vaginal discharge that smells bad.  Severe pain, cramping or tenderness in your lower belly area.  A need to urinate more frequently (use the toilet more often), more urgently (use the toilet very quickly), or it burns when you urinate.  Nausea and vomiting.  Redness, swelling or pain around a vein in your leg.  Problems breastfeeding or a red or painful area on your breast.  Chest pain and cough or are gasping for air.  Problems coping with sadness, anxiety, or depression.  If you have any concerns about hurting yourself or the baby, call your provider immediately.   You have questions or concerns after you return home.     Keep your hands clean:  Always wash your hands before touching your perineal area and stitches.  This helps reduce your risk of infection.  If your hands aren't dirty, you may use an alcohol hand-rub to clean your hands. Keep your nails clean and short.    Warning Signs after Having a Baby    Keep this paper on your fridge or somewhere else where you can see it.    Call your provider if you have any of these symptoms up to 12 weeks after having your  baby.    Thoughts of hurting yourself or your baby  Pain in your chest or trouble breathing  Severe headache not helped by pain medicine  Eyesight concerns (blurry vision, seeing spots or flashes of light, other changes to eyesight)  Fainting, shaking or other signs of a seizure    Call 9-1-1 if you feel that it is an emergency.     The symptoms below can happen to anyone after giving birth. They can be very serious. Call your provider if you have any of these warning signs.    My provider s phone number: _______________________    Losing too much blood (hemorrhage)    Call your provider if you soak through a pad in less than an hour or pass blood clots bigger than a golf ball. These may be signs that you are bleeding too much.    Blood clots in the legs or lungs    After you give birth, your body naturally clots its blood to help prevent blood loss. Sometimes this increased clotting can happen in other areas of the body, like the legs or lungs. This can block your blood flow and be very dangerous.     Call your provider if you:  Have a red, swollen spot on the back of your leg that is warm or painful when you touch it.   Are coughing up blood.     Infection    Call your provider if you have any of these symptoms:  Fever of 100.4 F (38 C) or higher.  Pain or redness around your stitches if you had an incision.   Any yellow, white, or green fluid coming from places where you had stitches or surgery.    Mood Problems (postpartum depression)    Many people feel sad or have mood changes after having a baby. But for some people, these mood swings are worse.     Call your provider right away if you feel so anxious or nervous that you can't care for yourself or your baby.    Preeclampsia (high blood pressure)    Even if you didn't have high blood pressure when you were pregnant, you are at risk for the high blood pressure disease called preeclampsia. This risk can last up to 12 weeks after giving birth.     Call your  provider if you have:   Pain on your right side under your rib cage  Sudden swelling in the hands and face    Remember: You know your body. If something doesn't feel right, get medical help.     For informational purposes only. Not to replace the advice of your health care provider. Copyright 2020 [facility]. All rights reserved. Clinically reviewed by Qing Sprague, RNC-OB, MSN. [employer] 326229 - Rev 02/23.

## 2023-07-25 NOTE — PLAN OF CARE
"  Problem: Plan of Care - These are the overarching goals to be used throughout the patient stay.    Goal: Plan of Care Review  Description: The Plan of Care Review/Shift note should be completed every shift.  The Outcome Evaluation is a brief statement about your assessment that the patient is improving, declining, or no change.  This information will be displayed automatically on your shift note.  Outcome: Met  Goal: Patient-Specific Goal (Individualized)  Description: You can add care plan individualizations to a care plan. Examples of Individualization might be:  \"Parent requests to be called daily at 9am for status\", \"I have a hard time hearing out of my right ear\", or \"Do not touch me to wake me up as it startles me\".  Outcome: Met  Goal: Absence of Hospital-Acquired Illness or Injury  Outcome: Met  Intervention: Prevent Skin Injury  Recent Flowsheet Documentation  Taken 7/25/2023 0805 by Nelli Shore RN  Body Position:   position changed independently   supine, head elevated  Goal: Optimal Comfort and Wellbeing  Outcome: Met  Intervention: Provide Person-Centered Care  Recent Flowsheet Documentation  Taken 7/25/2023 0805 by Nelli Shore RN  Trust Relationship/Rapport:   care explained   choices provided   empathic listening provided   questions answered   questions encouraged   reassurance provided   thoughts/feelings acknowledged     Problem: Postpartum (Vaginal Delivery)  Goal: Hemostasis  Outcome: Met  Goal: Absence of Infection Signs and Symptoms  Outcome: Met  Goal: Anesthesia/Sedation Recovery  Outcome: Met  Goal: Optimal Pain Control and Function  Outcome: Met  Goal: Effective Urinary Elimination  Outcome: Met     Problem: Breastfeeding  Goal: Effective Breastfeeding  Outcome: Met   Goal Outcome Evaluation:     Patient set for discharge. Care plan completed.                   "

## 2024-06-13 ENCOUNTER — OFFICE VISIT (OUTPATIENT)
Dept: DERMATOLOGY | Facility: CLINIC | Age: 29
End: 2024-06-13
Payer: COMMERCIAL

## 2024-06-13 DIAGNOSIS — D22.9 MULTIPLE MELANOCYTIC NEVI: ICD-10-CM

## 2024-06-13 DIAGNOSIS — D22.5 ATYPICAL NEVUS OF BUTTOCK: Primary | ICD-10-CM

## 2024-06-13 PROCEDURE — 99203 OFFICE O/P NEW LOW 30 MIN: CPT | Performed by: DERMATOLOGY

## 2024-06-13 ASSESSMENT — PAIN SCALES - GENERAL: PAINLEVEL: NO PAIN (0)

## 2024-06-13 NOTE — PROGRESS NOTES
Dermatology New Patient Visit  June 13, 2024    Assessment and Plan:  Overall benign skin cancer screening exam.  She was reassured regarding her benign findings today.  We discussed the ABCDEs of melanoma as well as the signs and symptoms of nonmelanoma skin cancers, as well as our approach to some protective behaviors.  Family history of melanoma (she is unsure of the precise details, but believes her father had a melanoma on his back in his 40s).  We discussed that this would likely can for a slightly increased lifetime risk of melanoma for her.  We suggested once annual skin checks every 2 to 3 years.  One clinically atypical nevus on the buttock.  This lesion was not concerning for melanoma today.  We will simply follow-up clinically.  Multiple benign-appearing nevi on the back, right inframammary skin and abdomen.  Reassurance was provided regarding the benign appearance these lesions.  Benign skin findings including solar lentigines and cherry angiomas.  Reassurance was similarly provided.    Clayton Pacheco MD  Dermatology Attending    _____________________________________________    Chief Complaint: Skin cancer screening exam    History of present illness:  Mariaa is a very pleasant 29-year-old female who is new patient to our clinic today, presenting for a skin cancer screening exam.  Her primary concern is that her parents have had skin cancer, and she believes her father had a melanoma potentially in his 40s on his back.  She thinks her mother may have had a nonmelanoma skin cancer.  She has 1 mole of concern under her right bra line and which is longstanding but darker than her other moles.  Otherwise she does not believe she has any new or changing moles and denies any nonhealing sores.    Physical exam:  General: Well-appearing, no apparent distress  Skin: A cutaneous exam of the head, neck, chest, abdomen, back, bilateral upper and lower extremities and buttocks was performed.  A female chaperone  was offered for this portion of the visit but the patient declined.  On the right inframammary area, there is a domed filiform 3 mm dark brown-pink papule with no atypical features on dermoscopy.  On the left buttock, there is a 3 mm pink flat topped papule with a central brown raised area and a fried egg appearance.  On the back and anterior legs, there are scattered 2 to 3 mm uniformly pigmented brown macules and flattopped papules.  On the face and arms, there are light tan macules consistent with solar lentigines.  On the abdomen, there are rare 1 to 2 mm bright red domed papules.

## 2024-06-13 NOTE — LETTER
6/13/2024       RE: Mariaa Mann  [address] [id number]     Dear Colleague,    Thank you for referring your patient, Mariaa Mann, to the [facility] [place] at [facility]. Please see a copy of my visit note below.    Dermatology New Patient Visit  June 13, 2024    Assessment and Plan:  Overall benign skin cancer screening exam.  She was reassured regarding her benign findings today.  We discussed the ABCDEs of melanoma as well as the signs and symptoms of nonmelanoma skin cancers, as well as our approach to some protective behaviors.  Family history of melanoma (she is unsure of the precise details, but believes her father had a melanoma on his back in his 40s).  We discussed that this would likely can for a slightly increased lifetime risk of melanoma for her.  We suggested once annual skin checks every 2 to 3 years.  One clinically atypical nevus on the buttock.  This lesion was not concerning for melanoma today.  We will simply follow-up clinically.  Multiple benign-appearing nevi on the back, right inframammary skin and abdomen.  Reassurance was provided regarding the benign appearance these lesions.  Benign skin findings including solar lentigines and cherry angiomas.  Reassurance was similarly provided.    Clayton Pacheco MD  Dermatology Attending    _____________________________________________    Chief Complaint: Skin cancer screening exam    History of present illness:  Mariaa is a very pleasant 29-year-old female who is new patient to our clinic today, presenting for a skin cancer screening exam.  Her primary concern is that her parents have had skin cancer, and she believes her father had a melanoma potentially in his 40s on his back.  She thinks her mother may have had a nonmelanoma skin cancer.  She has 1 mole of concern under her right bra line and which is longstanding but darker than  her other moles.  Otherwise she does not believe she has any new or changing moles and denies any nonhealing sores.    Physical exam:  General: Well-appearing, no apparent distress  Skin: A cutaneous exam of the head, neck, chest, abdomen, back, bilateral upper and lower extremities and buttocks was performed.  A female chaperone was offered for this portion of the visit but the patient declined.  On the right inframammary area, there is a domed filiform 3 mm dark brown-pink papule with no atypical features on dermoscopy.  On the left buttock, there is a 3 mm pink flat topped papule with a central brown raised area and a fried egg appearance.  On the back and anterior legs, there are scattered 2 to 3 mm uniformly pigmented brown macules and flattopped papules.  On the face and arms, there are light tan macules consistent with solar lentigines.  On the abdomen, there are rare 1 to 2 mm bright red domed papules.

## 2024-06-13 NOTE — NURSING NOTE
Dermatology Rooming Note    Mariaa Mann's goals for this visit include:   Chief Complaint   Patient presents with    Skin Check     Mariaa is here today for a skin check      SIL Razo - Dermatology

## 2024-06-17 PROBLEM — D22.9 MULTIPLE MELANOCYTIC NEVI: Status: ACTIVE | Noted: 2024-06-17

## 2024-06-17 PROBLEM — D22.5: Status: ACTIVE | Noted: 2024-06-17

## 2024-07-14 ENCOUNTER — HEALTH MAINTENANCE LETTER (OUTPATIENT)
Age: 29
End: 2024-07-14

## 2024-11-04 LAB
HEPATITIS B SURFACE ANTIGEN (EXTERNAL): NEGATIVE
HIV1+2 AB SERPL QL IA: NONREACTIVE
RUBELLA ANTIBODY IGG (EXTERNAL): NORMAL
VDRL (SYPHILIS) (EXTERNAL): NEGATIVE

## 2025-06-02 LAB — GROUP B STREPTOCOCCUS (EXTERNAL): NEGATIVE

## 2025-06-17 ENCOUNTER — HOSPITAL ENCOUNTER (INPATIENT)
Facility: CLINIC | Age: 30
LOS: 1 days | Discharge: HOME | End: 2025-06-18
Attending: SPECIALIST | Admitting: SPECIALIST
Payer: COMMERCIAL

## 2025-06-17 ENCOUNTER — ANESTHESIA (OUTPATIENT)
Dept: OBGYN | Facility: CLINIC | Age: 30
End: 2025-06-17
Payer: COMMERCIAL

## 2025-06-17 ENCOUNTER — ANESTHESIA EVENT (OUTPATIENT)
Dept: OBGYN | Facility: CLINIC | Age: 30
End: 2025-06-17
Payer: COMMERCIAL

## 2025-06-17 LAB
ABO + RH BLD: NORMAL
BLD GP AB SCN SERPL QL: NEGATIVE
ERYTHROCYTE [DISTWIDTH] IN BLOOD BY AUTOMATED COUNT: 12.5 % (ref 10–15)
ERYTHROCYTE [DISTWIDTH] IN BLOOD BY AUTOMATED COUNT: 12.6 % (ref 10–15)
HCT VFR BLD AUTO: 35.8 % (ref 35–47)
HCT VFR BLD AUTO: 36.2 % (ref 35–47)
HGB BLD-MCNC: 12.2 G/DL (ref 11.7–15.7)
HGB BLD-MCNC: 12.3 G/DL (ref 11.7–15.7)
MCH RBC QN AUTO: 29.9 PG (ref 26.5–33)
MCH RBC QN AUTO: 30.1 PG (ref 26.5–33)
MCHC RBC AUTO-ENTMCNC: 33.7 G/DL (ref 31.5–36.5)
MCHC RBC AUTO-ENTMCNC: 34.4 G/DL (ref 31.5–36.5)
MCV RBC AUTO: 88 FL (ref 78–100)
MCV RBC AUTO: 89 FL (ref 78–100)
PLATELET # BLD AUTO: 107 10E3/UL (ref 150–450)
PLATELET # BLD AUTO: 121 10E3/UL (ref 150–450)
RBC # BLD AUTO: 4.08 10E6/UL (ref 3.8–5.2)
RBC # BLD AUTO: 4.08 10E6/UL (ref 3.8–5.2)
SPECIMEN EXP DATE BLD: NORMAL
T PALLIDUM AB SER QL: NONREACTIVE
WBC # BLD AUTO: 13.1 10E3/UL (ref 4–11)
WBC # BLD AUTO: 15.7 10E3/UL (ref 4–11)

## 2025-06-17 PROCEDURE — 250N000009 HC RX 250: Performed by: SPECIALIST

## 2025-06-17 PROCEDURE — 86900 BLOOD TYPING SEROLOGIC ABO: CPT | Performed by: SPECIALIST

## 2025-06-17 PROCEDURE — 370N000003 HC ANESTHESIA WARD SERVICE: Performed by: ANESTHESIOLOGY

## 2025-06-17 PROCEDURE — 3E0R3BZ INTRODUCTION OF ANESTHETIC AGENT INTO SPINAL CANAL, PERCUTANEOUS APPROACH: ICD-10-PCS | Performed by: ANESTHESIOLOGY

## 2025-06-17 PROCEDURE — 85014 HEMATOCRIT: CPT | Performed by: SPECIALIST

## 2025-06-17 PROCEDURE — 258N000003 HC RX IP 258 OP 636: Performed by: ANESTHESIOLOGY

## 2025-06-17 PROCEDURE — 120N000013 HC R&B IMCU

## 2025-06-17 PROCEDURE — 85610 PROTHROMBIN TIME: CPT | Performed by: SPECIALIST

## 2025-06-17 PROCEDURE — 0KQM0ZZ REPAIR PERINEUM MUSCLE, OPEN APPROACH: ICD-10-PCS | Performed by: OBSTETRICS & GYNECOLOGY

## 2025-06-17 PROCEDURE — 250N000011 HC RX IP 250 OP 636: Mod: JZ | Performed by: ANESTHESIOLOGY

## 2025-06-17 PROCEDURE — 36415 COLL VENOUS BLD VENIPUNCTURE: CPT | Performed by: SPECIALIST

## 2025-06-17 PROCEDURE — 85730 THROMBOPLASTIN TIME PARTIAL: CPT | Performed by: SPECIALIST

## 2025-06-17 PROCEDURE — 250N000013 HC RX MED GY IP 250 OP 250 PS 637: Performed by: OBSTETRICS & GYNECOLOGY

## 2025-06-17 PROCEDURE — 722N000001 HC LABOR CARE VAGINAL DELIVERY SINGLE

## 2025-06-17 PROCEDURE — 250N000011 HC RX IP 250 OP 636: Performed by: ANESTHESIOLOGY

## 2025-06-17 PROCEDURE — 250N000013 HC RX MED GY IP 250 OP 250 PS 637: Performed by: SPECIALIST

## 2025-06-17 PROCEDURE — G0463 HOSPITAL OUTPT CLINIC VISIT: HCPCS

## 2025-06-17 PROCEDURE — 86780 TREPONEMA PALLIDUM: CPT | Performed by: SPECIALIST

## 2025-06-17 PROCEDURE — 85384 FIBRINOGEN ACTIVITY: CPT | Performed by: SPECIALIST

## 2025-06-17 PROCEDURE — 00HU33Z INSERTION OF INFUSION DEVICE INTO SPINAL CANAL, PERCUTANEOUS APPROACH: ICD-10-PCS | Performed by: ANESTHESIOLOGY

## 2025-06-17 RX ORDER — NALOXONE HYDROCHLORIDE 0.4 MG/ML
0.4 INJECTION, SOLUTION INTRAMUSCULAR; INTRAVENOUS; SUBCUTANEOUS
Status: DISCONTINUED | OUTPATIENT
Start: 2025-06-17 | End: 2025-06-19 | Stop reason: HOSPADM

## 2025-06-17 RX ORDER — LIDOCAINE 40 MG/G
CREAM TOPICAL
Status: DISCONTINUED | OUTPATIENT
Start: 2025-06-17 | End: 2025-06-19 | Stop reason: HOSPADM

## 2025-06-17 RX ORDER — EPHEDRINE SULFATE 50 MG/ML
5 INJECTION, SOLUTION INTRAMUSCULAR; INTRAVENOUS; SUBCUTANEOUS
Status: DISCONTINUED | OUTPATIENT
Start: 2025-06-17 | End: 2025-06-17 | Stop reason: HOSPADM

## 2025-06-17 RX ORDER — LOPERAMIDE HYDROCHLORIDE 2 MG/1
2 CAPSULE ORAL
Status: DISCONTINUED | OUTPATIENT
Start: 2025-06-17 | End: 2025-06-17 | Stop reason: HOSPADM

## 2025-06-17 RX ORDER — NALBUPHINE HYDROCHLORIDE 10 MG/ML
2.5-5 INJECTION INTRAMUSCULAR; INTRAVENOUS; SUBCUTANEOUS EVERY 6 HOURS PRN
Status: DISCONTINUED | OUTPATIENT
Start: 2025-06-17 | End: 2025-06-19 | Stop reason: HOSPADM

## 2025-06-17 RX ORDER — KETOROLAC TROMETHAMINE 15 MG/ML
15 INJECTION, SOLUTION INTRAMUSCULAR; INTRAVENOUS
Status: COMPLETED | OUTPATIENT
Start: 2025-06-17 | End: 2025-06-17

## 2025-06-17 RX ORDER — NALOXONE HYDROCHLORIDE 0.4 MG/ML
0.2 INJECTION, SOLUTION INTRAMUSCULAR; INTRAVENOUS; SUBCUTANEOUS
Status: DISCONTINUED | OUTPATIENT
Start: 2025-06-17 | End: 2025-06-19 | Stop reason: HOSPADM

## 2025-06-17 RX ORDER — METOCLOPRAMIDE HYDROCHLORIDE 5 MG/ML
10 INJECTION INTRAMUSCULAR; INTRAVENOUS EVERY 6 HOURS PRN
Status: DISCONTINUED | OUTPATIENT
Start: 2025-06-17 | End: 2025-06-17 | Stop reason: HOSPADM

## 2025-06-17 RX ORDER — ACETAMINOPHEN 325 MG/1
650 TABLET ORAL EVERY 4 HOURS PRN
Status: DISCONTINUED | OUTPATIENT
Start: 2025-06-17 | End: 2025-06-19 | Stop reason: HOSPADM

## 2025-06-17 RX ORDER — ROPIVACAINE HYDROCHLORIDE 2 MG/ML
10 INJECTION, SOLUTION EPIDURAL; INFILTRATION; PERINEURAL ONCE
Status: COMPLETED | OUTPATIENT
Start: 2025-06-17 | End: 2025-06-17

## 2025-06-17 RX ORDER — PROCHLORPERAZINE MALEATE 5 MG/1
10 TABLET ORAL EVERY 6 HOURS PRN
Status: DISCONTINUED | OUTPATIENT
Start: 2025-06-17 | End: 2025-06-17 | Stop reason: HOSPADM

## 2025-06-17 RX ORDER — OXYTOCIN/0.9 % SODIUM CHLORIDE 30/500 ML
100-340 PLASTIC BAG, INJECTION (ML) INTRAVENOUS CONTINUOUS PRN
Status: DISCONTINUED | OUTPATIENT
Start: 2025-06-17 | End: 2025-06-19 | Stop reason: HOSPADM

## 2025-06-17 RX ORDER — LOPERAMIDE HYDROCHLORIDE 2 MG/1
4 CAPSULE ORAL
Status: DISCONTINUED | OUTPATIENT
Start: 2025-06-17 | End: 2025-06-19 | Stop reason: HOSPADM

## 2025-06-17 RX ORDER — OXYTOCIN/0.9 % SODIUM CHLORIDE 30/500 ML
340 PLASTIC BAG, INJECTION (ML) INTRAVENOUS CONTINUOUS PRN
Status: DISCONTINUED | OUTPATIENT
Start: 2025-06-17 | End: 2025-06-19 | Stop reason: HOSPADM

## 2025-06-17 RX ORDER — POLYETHYLENE GLYCOL 3350 17 G/17G
17 POWDER, FOR SOLUTION ORAL DAILY PRN
Status: DISCONTINUED | OUTPATIENT
Start: 2025-06-17 | End: 2025-06-19 | Stop reason: HOSPADM

## 2025-06-17 RX ORDER — OXYTOCIN/0.9 % SODIUM CHLORIDE 30/500 ML
340 PLASTIC BAG, INJECTION (ML) INTRAVENOUS CONTINUOUS PRN
Status: DISCONTINUED | OUTPATIENT
Start: 2025-06-17 | End: 2025-06-17 | Stop reason: HOSPADM

## 2025-06-17 RX ORDER — ONDANSETRON 4 MG/1
4 TABLET, ORALLY DISINTEGRATING ORAL EVERY 6 HOURS PRN
Status: DISCONTINUED | OUTPATIENT
Start: 2025-06-17 | End: 2025-06-17 | Stop reason: HOSPADM

## 2025-06-17 RX ORDER — ONDANSETRON 2 MG/ML
4 INJECTION INTRAMUSCULAR; INTRAVENOUS EVERY 6 HOURS PRN
Status: DISCONTINUED | OUTPATIENT
Start: 2025-06-17 | End: 2025-06-17 | Stop reason: HOSPADM

## 2025-06-17 RX ORDER — BISACODYL 10 MG
10 SUPPOSITORY, RECTAL RECTAL DAILY PRN
Status: DISCONTINUED | OUTPATIENT
Start: 2025-06-17 | End: 2025-06-19 | Stop reason: HOSPADM

## 2025-06-17 RX ORDER — METHYLERGONOVINE MALEATE 0.2 MG/ML
200 INJECTION INTRAVENOUS
Status: DISCONTINUED | OUTPATIENT
Start: 2025-06-17 | End: 2025-06-17 | Stop reason: HOSPADM

## 2025-06-17 RX ORDER — HYDROCORTISONE 25 MG/G
CREAM TOPICAL 3 TIMES DAILY PRN
Status: DISCONTINUED | OUTPATIENT
Start: 2025-06-17 | End: 2025-06-19 | Stop reason: HOSPADM

## 2025-06-17 RX ORDER — LOPERAMIDE HYDROCHLORIDE 2 MG/1
4 CAPSULE ORAL
Status: DISCONTINUED | OUTPATIENT
Start: 2025-06-17 | End: 2025-06-17 | Stop reason: HOSPADM

## 2025-06-17 RX ORDER — TERBUTALINE SULFATE 1 MG/ML
0.25 INJECTION SUBCUTANEOUS
Status: DISCONTINUED | OUTPATIENT
Start: 2025-06-17 | End: 2025-06-17 | Stop reason: HOSPADM

## 2025-06-17 RX ORDER — MISOPROSTOL 200 UG/1
800 TABLET ORAL
Status: DISCONTINUED | OUTPATIENT
Start: 2025-06-17 | End: 2025-06-17 | Stop reason: HOSPADM

## 2025-06-17 RX ORDER — TRANEXAMIC ACID 10 MG/ML
1 INJECTION, SOLUTION INTRAVENOUS EVERY 30 MIN PRN
Status: DISCONTINUED | OUTPATIENT
Start: 2025-06-17 | End: 2025-06-19 | Stop reason: HOSPADM

## 2025-06-17 RX ORDER — OXYTOCIN 10 [USP'U]/ML
10 INJECTION, SOLUTION INTRAMUSCULAR; INTRAVENOUS
Status: DISCONTINUED | OUTPATIENT
Start: 2025-06-17 | End: 2025-06-17 | Stop reason: HOSPADM

## 2025-06-17 RX ORDER — IBUPROFEN 400 MG/1
800 TABLET, FILM COATED ORAL
Status: COMPLETED | OUTPATIENT
Start: 2025-06-17 | End: 2025-06-17

## 2025-06-17 RX ORDER — CARBOPROST TROMETHAMINE 250 UG/ML
250 INJECTION, SOLUTION INTRAMUSCULAR
Status: DISCONTINUED | OUTPATIENT
Start: 2025-06-17 | End: 2025-06-17 | Stop reason: HOSPADM

## 2025-06-17 RX ORDER — METOCLOPRAMIDE 10 MG/1
10 TABLET ORAL EVERY 6 HOURS PRN
Status: DISCONTINUED | OUTPATIENT
Start: 2025-06-17 | End: 2025-06-17 | Stop reason: HOSPADM

## 2025-06-17 RX ORDER — ROPIVACAINE HYDROCHLORIDE 2 MG/ML
INJECTION, SOLUTION EPIDURAL; INFILTRATION; PERINEURAL
Status: COMPLETED | OUTPATIENT
Start: 2025-06-17 | End: 2025-06-17

## 2025-06-17 RX ORDER — CITRIC ACID/SODIUM CITRATE 334-500MG
30 SOLUTION, ORAL ORAL
Status: DISCONTINUED | OUTPATIENT
Start: 2025-06-17 | End: 2025-06-17 | Stop reason: HOSPADM

## 2025-06-17 RX ORDER — MISOPROSTOL 200 UG/1
800 TABLET ORAL
Status: DISCONTINUED | OUTPATIENT
Start: 2025-06-17 | End: 2025-06-19 | Stop reason: HOSPADM

## 2025-06-17 RX ORDER — OXYTOCIN 10 [USP'U]/ML
10 INJECTION, SOLUTION INTRAMUSCULAR; INTRAVENOUS
Status: DISCONTINUED | OUTPATIENT
Start: 2025-06-17 | End: 2025-06-19 | Stop reason: HOSPADM

## 2025-06-17 RX ORDER — MISOPROSTOL 200 UG/1
400 TABLET ORAL
Status: DISCONTINUED | OUTPATIENT
Start: 2025-06-17 | End: 2025-06-19 | Stop reason: HOSPADM

## 2025-06-17 RX ORDER — METHYLERGONOVINE MALEATE 0.2 MG/ML
200 INJECTION INTRAVENOUS
Status: DISCONTINUED | OUTPATIENT
Start: 2025-06-17 | End: 2025-06-19 | Stop reason: HOSPADM

## 2025-06-17 RX ORDER — AMOXICILLIN 250 MG
2 CAPSULE ORAL
Status: DISCONTINUED | OUTPATIENT
Start: 2025-06-17 | End: 2025-06-19 | Stop reason: HOSPADM

## 2025-06-17 RX ORDER — OXYCODONE HYDROCHLORIDE 5 MG/1
5-10 TABLET ORAL EVERY 4 HOURS PRN
Refills: 0 | Status: DISCONTINUED | OUTPATIENT
Start: 2025-06-17 | End: 2025-06-19 | Stop reason: HOSPADM

## 2025-06-17 RX ORDER — TRANEXAMIC ACID 10 MG/ML
1 INJECTION, SOLUTION INTRAVENOUS EVERY 30 MIN PRN
Status: DISCONTINUED | OUTPATIENT
Start: 2025-06-17 | End: 2025-06-17 | Stop reason: HOSPADM

## 2025-06-17 RX ORDER — LOPERAMIDE HYDROCHLORIDE 2 MG/1
2 CAPSULE ORAL
Status: DISCONTINUED | OUTPATIENT
Start: 2025-06-17 | End: 2025-06-19 | Stop reason: HOSPADM

## 2025-06-17 RX ORDER — CARBOPROST TROMETHAMINE 250 UG/ML
250 INJECTION, SOLUTION INTRAMUSCULAR
Status: DISCONTINUED | OUTPATIENT
Start: 2025-06-17 | End: 2025-06-19 | Stop reason: HOSPADM

## 2025-06-17 RX ORDER — IBUPROFEN 400 MG/1
800 TABLET, FILM COATED ORAL EVERY 6 HOURS PRN
Status: DISCONTINUED | OUTPATIENT
Start: 2025-06-17 | End: 2025-06-19 | Stop reason: HOSPADM

## 2025-06-17 RX ORDER — ROPIVACAINE HYDROCHLORIDE 2 MG/ML
10 INJECTION, SOLUTION EPIDURAL; INFILTRATION; PERINEURAL ONCE
Status: DISCONTINUED | OUTPATIENT
Start: 2025-06-17 | End: 2025-06-17 | Stop reason: HOSPADM

## 2025-06-17 RX ORDER — MISOPROSTOL 200 UG/1
400 TABLET ORAL
Status: DISCONTINUED | OUTPATIENT
Start: 2025-06-17 | End: 2025-06-17 | Stop reason: HOSPADM

## 2025-06-17 RX ADMIN — IBUPROFEN 800 MG: 400 TABLET, FILM COATED ORAL at 18:18

## 2025-06-17 RX ADMIN — ROPIVACAINE HYDROCHLORIDE 10 ML: 2 INJECTION, SOLUTION EPIDURAL; INFILTRATION at 12:45

## 2025-06-17 RX ADMIN — Medication 340 ML/HR: at 17:04

## 2025-06-17 RX ADMIN — ACETAMINOPHEN 650 MG: 325 TABLET, FILM COATED ORAL at 18:18

## 2025-06-17 RX ADMIN — BUPIVACAINE HYDROCHLORIDE: 7.5 INJECTION, SOLUTION EPIDURAL; RETROBULBAR at 12:47

## 2025-06-17 ASSESSMENT — ACTIVITIES OF DAILY LIVING (ADL)
ADLS_ACUITY_SCORE: 20
ADLS_ACUITY_SCORE: 46
ADLS_ACUITY_SCORE: 20
ADLS_ACUITY_SCORE: 24
ADLS_ACUITY_SCORE: 20
ADLS_ACUITY_SCORE: 24
ADLS_ACUITY_SCORE: 46
ADLS_ACUITY_SCORE: 20
ADLS_ACUITY_SCORE: 24
ADLS_ACUITY_SCORE: 24
ADLS_ACUITY_SCORE: 20

## 2025-06-17 NOTE — PROGRESS NOTES
Data: Patient presented to [facility] at 0749.   Reason for maternal/fetal assessment per patient is labor assessment.  Patient is a . Prenatal record reviewed.    Gestational Age 40+5wks. VSS. Fetal movement present. Patient denies  backache, vaginal discharge, pelvic pressure, UTI symptoms, GI problems, bloody show, vaginal bleeding, edema, headache, visual disturbances, epigastric or URQ pain, abdominal pain, rupture of membranes. Support persons  Richard present.  Action: Verbal consent for EFM. Triage assessment completed. EFM applied for fetal wellbeing with labor ctxs. Uterine assessment infrequent moderate in palpation ctxs since 0400 this am. Fetal assessment: Presumed adequate fetal oxygenation documented (see flow record).   Dr. De Leon informed of pts arrival, ctx pattern, fetal tracing and cervical change. Plan per provider is admit to labor with expectant management, recheck cervix in 2hrs and update. Patient verbalized agreement with plan. Patient transferred to room 218 ambulatory, oriented to room and call light. Report given to LAUREEN Jaime RN.

## 2025-06-17 NOTE — PLAN OF CARE
of a male infant at 1702.  ml. 2nd deg lac with a possible internal hematoma. Perineum very swollen, ice applied. Will keep a close eye on suspected hematoma. Infant and pt doing well. See delivery summary and MD note for further details.

## 2025-06-17 NOTE — L&D DELIVERY NOTE
OB Vaginal Delivery Note    Mariaa Mann MRN# [id number]   Age: 30 year old YOB: 1995       GA: 40w5d  GP:   Labor Complications: Fetal Intolerance  EBL: 200 mL  Delivery QBL:    Delivery Type: Vaginal, Spontaneous  ROM to Delivery Time: (Delivered) Hours: 3 Minutes: 2  [place] Weight:     1 Minute 5 Minute 10 Minute   Apgar Totals: 9   9        CALEB LAZCANO;JUAN MORIN;SOLO PATEL    Delivery Details:  Mariaa Mann, a 30 year old  female delivered a viable infant with apgars of 9  and 9 . Patient presented in early labor at 3/70/-2. She progressed appropriately and received an epidural. SROM clear at 1400. She was complete/+1 at 1527.  Delivery was via vaginal, spontaneous to a sterile field under epidural anesthesia. Infant delivered in vertex left occiput anterior position. Anterior and posterior shoulders delivered without difficulty. The cord was clamped, cut twice and  3 vessels  were noted. Cord blood was obtained in routine fashion with the following disposition: lab.      Cord complications: nuchal  Placenta delivered at 2025  5:04 PM. Placental disposition was Hospital disposal. Fundal massage performed and fundus found to be firm.     Episiotomy: none   Perineum, vagina, cervix were inspected, and the following lacerations were noted:   Perineal lacerations: 2nd               Any lacerations were repaired in the usual fashion using 3-0 vicryl.  Significant soft tissue swelling noted on right side of vagina after delivery. There was no expansion appreciated    Excellent hemostasis was noted. Needle count correct. Infant and patient in delivery room in good and stable condition.        Hang Mann-Mariaa [[id number]]      Labor Event Times      Latent labor onset date/time: 2025 09:30    Active labor onset date: 25 Onset time: 12:06 PM   Dilation complete date: 25 Complete time:  3:27 PM   Start pushing date/time:  2025 15:39          Labor Events     labor?: No   steroids: None  Labor Type: Spontaneous     Antibiotics received during labor?: No     Rupture identifier: Sac 1  Rupture date/time: 25 14:00   Rupture type: Spontaneous Rupture of Membranes  Fluid color: Clear  Fluid odor: Normal     Augmentation: None       Delivery/Placenta Date and Time      Delivery Date: 25 Delivery Time:  5:02 PM   Placenta Date/Time: 2025  5:04 PM  Oxytocin given at the time of delivery: after delivery of baby  Delivering clinician: Schwab, Jennifer Lani, MD   Other personnel present at delivery:  Provider Role   Julita Jaime, HOSEA White, Keesha Otero, HOSEA Pulido, Myriam HARGROVE RN              Vaginal Counts       Initial count performed by 2 team members:  Two Team Members   Dr Schwab Rachel Busse         [place] Suture Needles Sponges (RETIRED) Instruments   Initial counts 2  5    Added to count  1     Relief counts       Final counts 2 1 5            Placed during labor Accounted for at the end of labor   FSE NA NA   IUPC NA NA   Cervidil NA NA                  Final count performed by 2 team members:  Two Team Members   Rachel Busse Dr Schwab      Final count correct?: Yes  Pre-Birth Team Brief: Complete  Post-Birth Team Debrief: Complete       Apgars    Living status: Living   1 Minute 5 Minute 10 Minute 15 Minute 20 Minute   Skin color: 1  1       Heart rate: 2  2       Reflex irritability: 2  2       Muscle tone: 2  2       Respiratory effort: 2  2       Total: 9  9       Apgars assigned by: XI JC       Cord      Cord Complications: Nuchal   Nuchal Intervention: delivered through         Nuchal cord description: loose nuchal cord         Cord Blood Disposition: Lab    Gases Sent?: No    Delayed cord clamping?: Yes    Cord Clamping Delay (seconds):  seconds    Stem cell collection?: No           Sun Resuscitation    Methods: None   Care at Delivery: Baby dried and stimulated  at mother's abd. Remains skin to skin with mother.  Output in Delivery Room: Voided        Measurements    Output in delivery room: Voided       Skin to Skin and Feeding Plan      Skin to skin initiation date/time: 1841    Skin to skin with: Mother  Skin to skin end date/time:            Labor Events and Shoulder Dystocia    Fetal Tracing Prior to Delivery: Category 2  Shoulder dystocia present?: Neg       Delivery (Maternal) (Provider to Complete) ([id number])    Episiotomy: None  Perineal lacerations: 2nd Repaired?: Yes   Est. blood loss (mL): 200  Repair suture: 3-0 Vicryl  Number of repair packets: 1  Genital tract inspection done: Pos       Blood Loss  Mother: Mariaa Mann #[id number]     Start of Mother's Information      Delivery Blood Loss   Intrapartum & Postpartum: 25 1206 - 25    Delivery Admission: 25 0749 - 255         Intrapartum & Postpartum Delivery Admission    EBL (mL) Hospital Encounter 200 mL 200 mL    Total  200 mL 200 mL               End of Mother's Information  Mother: Mariaa Mann #[id number]                Delivery - Provider to Complete ([id number])    Delivering clinician: Schwab, Jennifer Lani, MD  Delivery Type (Choose the 1 that will go to the Birth History): Vaginal, Spontaneous                         Other personnel:  Provider Role   Julita Jaime, Keesha Barraza RN Drewek, Lacy J, RN                     Placenta    Date/Time: 2025  5:04 PM  Removal: Spontaneous  Disposition: Hospital disposal             Anesthesia    Method: Epidural  Cervical dilation at placement: 4-7                    Presentation and Position    Presentation: Vertex    Position: Left Occiput Anterior                     Jennifer L. Schwab, MD

## 2025-06-17 NOTE — ANESTHESIA PREPROCEDURE EVALUATION
"Anesthesia Pre-Procedure Evaluation    Patient: Mariaa Mann   MRN: [id number] : 1995          Procedure :          Past Medical History:   Diagnosis Date    Crohn's disease (H)     PSC (primary sclerosing cholangitis) (H)       Past Surgical History:   Procedure Laterality Date    NO HISTORY OF SURGERY        Allergies   Allergen Reactions    Ceclor [Cefaclor] Hives    Zosyn [Piperacillin Sod-Tazobactam So] Swelling      Social History     Tobacco Use    Smoking status: Never    Smokeless tobacco: Never   Substance Use Topics    Alcohol use: Not Currently      Wt Readings from Last 1 Encounters:   23 66 kg (145 lb 8 oz)        Anesthesia Evaluation   Pt has had prior anesthetic. Type: OB Labor Epidural.        ROS/MED HX  ENT/Pulmonary:    (-) asthma   Neurologic:  - neg neurologic ROS     Cardiovascular:    (-) PIH   METS/Exercise Tolerance:     Hematologic:     (+)  no anticoagulation therapy, no coagulopathy,             Musculoskeletal:       GI/Hepatic:     (+) GERD,                   Renal/Genitourinary:       Endo:       Psychiatric/Substance Use:       Infectious Disease:       Malignancy:       Other:              Physical Exam  Airway  Mallampati: II  TM distance: > 3 FB  Neck ROM: full    Cardiovascular - normal exam   Dental - normal exam    Pulmonary - normal exam      Neurological   Other Findings       OUTSIDE LABS:  CBC:   Lab Results   Component Value Date    WBC 13.1 (H) 2025    HGB 12.3 2025    HGB 13.2 2023    HCT 35.8 2025     (L) 2025     BMP: No results found for: \"NA\", \"POTASSIUM\", \"CHLORIDE\", \"CO2\", \"BUN\", \"CR\", \"GLC\"  COAGS: No results found for: \"PTT\", \"INR\", \"FIBR\"  POC: No results found for: \"BGM\", \"HCG\", \"HCGS\"  HEPATIC: No results found for: \"ALBUMIN\", \"PROTTOTAL\", \"ALT\", \"AST\", \"GGT\", \"ALKPHOS\", \"BILITOTAL\", \"BILIDIRECT\", \"DRAGAN\"  OTHER: No results found for: \"PH\", \"LACT\", \"A1C\", \"LUISA\", \"PHOS\", \"MAG\", \"LIPASE\", \"AMYLASE\", " "\"TSH\", \"T4\", \"T3\", \"CRP\", \"SED\"    Anesthesia Plan    ASA Status:  2       Anesthesia Type: Epidural.        Consents    Anesthesia Plan(s) and associated risks, benefits, and realistic alternatives discussed. Questions answered and patient/representative(s) expressed understanding.     - Discussed:     - Discussed with:  Patient               Postoperative Care         Comments:    Other Comments: Orders to manage the epidural infusion have been entered, and through coordination with the nurse, we will continute to manage and monitor the patient's labor epidural.  We will continuously be available to adjust as needed thruout the entire L&D process.                Michael Shell MD    I have reviewed the pertinent notes and labs in the chart from the past 30 days and (re)examined the patient.  Any updates or changes from those notes are reflected in this note.    Clinically Significant Risk Factors Present on Admission                 # Thrombocytopenia: Lowest platelets = 107 in last 2 days, will monitor for bleeding                              "

## 2025-06-17 NOTE — ANESTHESIA PROCEDURE NOTES
Epidural catheter Procedure Note    Pre-Procedure   Staff -        Anesthesiologist:  Michael Shell MD       Performed By: anesthesiologist       Referred By: OB       Location: OB       Pre-Anesthestic Checklist: patient identified, IV checked, risks and benefits discussed, informed consent, monitors and equipment checked, pre-op evaluation and at physician/surgeon's request  Timeout:       Correct Patient: Yes        Correct Procedure: Yes        Correct Site: Yes        Correct Position: Yes   Procedure Documentation  Procedure: epidural catheter         Patient Position: sitting       Patient Prep/Sterile Barriers: sterile gloves, mask, patient draped       Skin prep: Betadine       Local skin infiltrated with mL of 1% lidocaine.        Insertion Site: L2-3. (midline approach).       Technique: LORT saline        CHRISTOPHE at 4.5 cm.       Needle Type: Infinite Enzymesy needle       Needle Gauge: 17.        Needle Length (Inches): 3.5           Catheter threaded easily.         3 cm epidural space.           # of attempts: 1 and  # of redirects:     Assessment/Narrative         Paresthesias: No.       Test dose of 3 mL lidocaine 1.5% w/ 1:200,000 epinephrine at.         Test dose negative, 3 minutes after injection, for signs of intravascular, subdural, or intrathecal injection.       Insertion/Infusion Method: LORT saline       Aspiration negative for Heme or CSF via Epidural Catheter.    Medication(s) Administered   0.2% Ropivacaine (Epidural) - EPIDURAL   10 mL - 6/17/2025 12:45:00 PM   Comments:  No complications. Sterile Dressing. Orders to manage the epidural infusion have been entered and, through coordination with the nurse, we will continue to manage and monitor the patient's labor epidural.  We will continuously be available to adjust as needed throughout the entire labor and delivery process.        FOR [facility] ONLY:   Pain Team Contact information: please page the Pain Team Via [employer]. Search  Physical Therapy Progress Note    Visit Type: Progress Note  Visit: 22  Referring Provider: Raj Sheridan MD  Medical Diagnosis (from order): Diagnosis Information    Diagnosis  716.81 (ICD-9-CM) - M12.811 (ICD-10-CM) - Rotator cuff arthropathy of right shoulder         SUBJECTIVE                                                                                                               Pt states that she has forgotten most of her exercises and can't find her handouts that she was given.  Has only been doing the 2# weight for biceps, has not done the wall slides or laying down strengthening.  Was tearful throughout stating that she feels like a failure.  Provided reassurance that she can restart her HEP and regain more strength but she has to make an appt with herself daily to do all of her exercises.  Discussed strategies again that she can utilize like setting an alarm on her phone, setting a specific time each day like she is coming in for PT and doing the exercises.  Pt reports that she is struggling with her home being very cluttered and having trouble moving around the house, can barely lay on top of the bed because it is full of items.  She notes she is nervous to accept help from her friend and family.  Provided support throughout session.     Pt notes that she feels depressed and is currently working with primary to change some of her medications and hopes this will help with her ability to complete her HEP    Pain / Symptoms  - Pain rating (out of 10): Current: 0 ; Best: 0; Worst: 2      OBJECTIVE                                                                                                                     Range of Motion (ROM)   (degrees unless noted; active unless noted; norms in ( ); negative=lacking to 0, positive=beyond 0)  Shoulder:    - Flexion (180):        • Right: 70   Passive: 125    - Abduction (180):        • Right: 60   Passive: 125    - Internal Rotation:        - at 45°:             "\"Pain\". During daytime hours, please page the attending first. At night please page the resident first.      " • Right:   Passive: 60                       Treatment     Therapeutic Exercise  Went through all of her exercises from previous visits and came up with a number of them to focus on.  Provided again with a grid to help her visualize that she has completed them and can check them off.  Texted her HEP to her so if she misplaces the handouts again she has a digital copy and also reinforced that at anytime I can re-print and have her pick them up if working with digital copy is too challenging.     -supine shoulder flexion 2# x10x2, appears more challenging since last visit but able to assist with left hand as needed in midrange where she has increased challenge  -supine cane AAROM for flexion and scaption x10  -supine rhythmic stabilization with 2# weight doing small ranges in mid range of shoulder flexion/extension until fatigued x2  -supine horizontal abduction with yellow band x12 x2  -sidelying ER x10  -sidelying abduction 0-90 x10  -seated bent over row 2# x20 x2        Manual Therapy   Manual stretching of shoulder in all planes as tolerated    Home Exercise Program  Access Code: [email or web address]  URL: [email or web address]  Date: 04/05/2023  Prepared by: Shawanda    Exercises  - Seated Scapular Retraction  - 3 x daily - 10 reps  - Shoulder Rolls in Sitting  - 3 x daily - 10 reps  - Supine Shoulder External Rotation in 45 Degrees Abduction AAROM with Dowel  - 2 x daily - 8 reps  - Seated Shoulder Flexion Towel Slide at Table Top  - 3 x daily - 5-8 reps  - Supine Shoulder Press AAROM in Abduction with Dowel  - 2 x daily - 15 reps  - Sidelying Shoulder Abduction Palm Forward  - 2 x daily - 8-10 reps  - Supine Shoulder Flexion AAROM  - 1 x daily - 3 sets - 10 reps  - Sidelying Shoulder External Rotation  - 1 x daily - 3 sets - 10 reps  - Shoulder Flexion Wall Slide with Towel  - 2 x daily - 2 sets - 10 reps  - Supine Shoulder Extension with Anchored Resistance  - 1 x daily - 2 sets - 10 reps  - Supine  Shoulder Horizontal Abduction with Resistance  - 1 x daily - 2 sets - 10 reps  - Supine Shoulder Rhythmic Stabilization- Flexion/Extension  - 1 x daily - 2 sets - 10 reps  - Seated Bent Over Shoulder Row with Dumbbells  - 1 x daily - 2 sets - 15-20 reps  - Supine Shoulder Flexion with Free Weight  - 2 x daily - 2 sets - 10-12 reps        ASSESSMENT                                                                                                          To date the patient has made gains not as expected due to Medical issues and mental health issues that she has been seeing her primary for.  Patient will continue to benefit from skilled care as outlined.    Pt with gap in care of PT for follow up in her strengthening for her HEP to keep progressing her functional strength.  She is noting struggles with depression and tearful throughout session but is working with primary to help her.  She displays very functional PROM of her shoulder, mildly decreased strength and ability to complete her supine anti-gravity strengthening with her gap in care and no carryover at home due to some medical and mental health issues.  Provided consistent reassurance throughout session and encouragement as she has good potential to progress with improvement in consistency with doing her HEP.  Provided with new handouts, sent electronic copy to her phone and explained that if she misplaces the handouts again to come  new ones.  Went over strategies for improving compliance and printed another tracking sheet that she can check off when she does her exercises.  Discussed setting an alarm on her phone for reminders and making an appointment with herself for doing her exercises daily.    Do feel that patient would benefit from continued skilled PT if she displays carryover of her HEP at home.  Explained that it takes time and consistency to build strength.  Supine strengthening and wall slides with get easier and carryover to function  strength so she can lift her arm higher at home.  Without consistency progress is much slower.   Pain/symptoms after session (out of 10): 0  Education:   - Results of above outlined education: Verbalizes understanding    PLAN                                                                                                                          Modify interventions, frequency and duration per below.  The following skilled interventions to be implemented to achieve goals listed below:  Neuromuscular Re-Education (53511)  Activities of Daily Living/Self Care (87571)  Therapeutic Exercise (47989)  Manual Therapy (24585)  Therapeutic Activity (49606)  Heat/Cold (71134)    Frequency / Duration for 10 weeks for an estimated total of 5 visits      Goals  Long Term Goals: to be met by end of plan of care  1. Patient will reach top of head for hair washing and management  2. Patient will  and lift a light grocery bag for grocery management  3. Pt will increase strength in right shoulder to have AROM to reach to 90 degrees or greater for reaching lower cabinet shelves  4. Quick DASH: Patient will complete form to reflect an improved calculated score to less than or equal to 50 to indicate patient reported improvement in function/disability/impairment. (minimal clinically important difference = 15.91)  Status: progressing/ongoing      Therapy procedure time and total treatment time can be found documented on the Time Entry flowsheet

## 2025-06-17 NOTE — PROVIDER NOTIFICATION
06/17/25 1208   Provider Notification   Provider Name/Title Dr De Leon   Method of Notification Phone   Request Evaluate - Remote   Notification Reason SVE;Status Update     MD updated on SVE. MD on her way to the hospital for AROM. Pt requesting epidural before AROM.    1247- MD called unit to say she was at [facility] but needs to go back to [facility] to push with a patient. Will come back after for AROM.     1310- MD called unit again. Just arrived at [facility] for her delivery. Her backups are both in the OR at [facility] and so she is calling the in house at [facility] to see if she will cover until she is able to make it over.

## 2025-06-17 NOTE — PROVIDER NOTIFICATION
06/17/25 1445   Provider Notification   Provider Name/Title Dr De Leon   Method of Notification Phone   Request Attend Delivery   Notification Reason SVE     MD updated on SVE and labor status. MD finishing up repair at [facility] and then will head over. Call in house if provider is needed sooner for delivery.

## 2025-06-18 VITALS
OXYGEN SATURATION: 94 % | SYSTOLIC BLOOD PRESSURE: 119 MMHG | TEMPERATURE: 98.2 F | HEART RATE: 77 BPM | DIASTOLIC BLOOD PRESSURE: 68 MMHG | RESPIRATION RATE: 18 BRPM

## 2025-06-18 LAB
APTT PPP: 31 SECONDS (ref 22–38)
FIBRINOGEN PPP-MCNC: 381 MG/DL (ref 170–510)
HGB BLD-MCNC: 11.6 G/DL (ref 11.7–15.7)
INR PPP: 0.94 (ref 0.85–1.15)
MCV RBC AUTO: 87 FL (ref 78–100)
PROTHROMBIN TIME: 12.4 SECONDS (ref 11.8–14.8)

## 2025-06-18 PROCEDURE — 85018 HEMOGLOBIN: CPT | Performed by: OBSTETRICS & GYNECOLOGY

## 2025-06-18 PROCEDURE — 250N000013 HC RX MED GY IP 250 OP 250 PS 637: Performed by: OBSTETRICS & GYNECOLOGY

## 2025-06-18 PROCEDURE — 36415 COLL VENOUS BLD VENIPUNCTURE: CPT | Performed by: OBSTETRICS & GYNECOLOGY

## 2025-06-18 RX ADMIN — IBUPROFEN 800 MG: 400 TABLET ORAL at 12:47

## 2025-06-18 RX ADMIN — ACETAMINOPHEN 650 MG: 325 TABLET, FILM COATED ORAL at 06:07

## 2025-06-18 RX ADMIN — ACETAMINOPHEN 650 MG: 325 TABLET, FILM COATED ORAL at 12:47

## 2025-06-18 RX ADMIN — ACETAMINOPHEN 650 MG: 325 TABLET, FILM COATED ORAL at 00:05

## 2025-06-18 RX ADMIN — ACETAMINOPHEN 650 MG: 325 TABLET, FILM COATED ORAL at 19:47

## 2025-06-18 RX ADMIN — IBUPROFEN 800 MG: 400 TABLET ORAL at 00:05

## 2025-06-18 RX ADMIN — IBUPROFEN 800 MG: 400 TABLET ORAL at 06:08

## 2025-06-18 RX ADMIN — IBUPROFEN 800 MG: 400 TABLET ORAL at 19:48

## 2025-06-18 ASSESSMENT — ACTIVITIES OF DAILY LIVING (ADL)
ADLS_ACUITY_SCORE: 28
ADLS_ACUITY_SCORE: 33
ADLS_ACUITY_SCORE: 31
ADLS_ACUITY_SCORE: 33
ADLS_ACUITY_SCORE: 31
ADLS_ACUITY_SCORE: 33
ADLS_ACUITY_SCORE: 28
ADLS_ACUITY_SCORE: 33
ADLS_ACUITY_SCORE: 31
ADLS_ACUITY_SCORE: 31
ADLS_ACUITY_SCORE: 28
ADLS_ACUITY_SCORE: 28
ADLS_ACUITY_SCORE: 31
ADLS_ACUITY_SCORE: 33
ADLS_ACUITY_SCORE: 33
ADLS_ACUITY_SCORE: 28
ADLS_ACUITY_SCORE: 33
ADLS_ACUITY_SCORE: 24
ADLS_ACUITY_SCORE: 31
ADLS_ACUITY_SCORE: 28
ADLS_ACUITY_SCORE: 31
ADLS_ACUITY_SCORE: 28

## 2025-06-18 NOTE — PLAN OF CARE
Patient, spouse, and  transferred to room 406 accompanied by Teresita CANTU RN. Bedside report received at this time. ID bands double verified. Patient and spouse oriented to room, call light, and plan of care. Safety protocols including safe sleep and bulb syringe use reviewed. Feeding log in new family book reviewed. Birth certificate and postpartum depression screening given. Encouraged to call with questions/concerns.

## 2025-06-18 NOTE — PROVIDER NOTIFICATION
06/18/25 0015   Provider Notification   Provider Name/Title Dr. De Leon   Method of Notification Electronic Page   Request Evaluate-Remote   Notification Reason Lab Results     Provider updated with lab results and patient status per request. Hemoglobin 12.2, platelets 121, WBCs 15.7 and coagulation labs within normal range. Vitals WDL and pain 0/10.

## 2025-06-18 NOTE — PLAN OF CARE
Date & Time: 6/18 4952-9923  Procedures: 6/17 - vag delivery  Orientation/Cognitive: AOx4  VS/O2: VSS, RA  Mobility: Independent  Diet: Regular  Pain Management: PRN tylenol & ibuprofen  Neuro: Intact  Bowel & Bladder: Voiding  Skin: Perineum swollen but improving  Abnormal Labs: WDL  IV Access/Drips/Fluids: R PIV SL  Discharge Plan: Pending  Other: FF/U2, scant flow.

## 2025-06-18 NOTE — H&P
"OB Admit  (Late entry due to patient care at another hospital)    Fiona is a 29 yo [id number] female who presented in early labor this morning.  She was john every six minutes but did change from 3 to 4 cm and was admitted in labor.  She received an epidural eventually and then ruptured membranes at 7-8 cm.  She progressed to complete and pushed approximately 90 minutes to deliver an 8#11oz.  Her delivery was attended by Dr Schwab as I was involved with two patients pushing at [facility].  Her delivery was complicated by a vaginal side wall hematoma on the right.  Of note her platelet count upon admission was 107K.  Had a couple of elevated BPs during pushing but overall normal.  No swelling, HA, vis changes, upper abdominal pain.    See separate delivery note for details.    Since delivery her bleeding has been normal.  She was unable to empty her bladder so she was catheterized.  Per report the hematoma has been stable.  She denies any pain currently.  Her epidural is still wearing off but she is able to move her legs well.    Preg risks  1) Crohn's disease, plans Remicade infusion postpartum    2) Primary sclerosing cholangitis    3) Anemia    PNL  A pos  Rub immune  Ur cx neg  HBsAg neg  HIV neg  HCV neg  GC/chl neg  Glucola 81  Trep ab neg  GBS neg  S/p tdap    PMH As above   Varicose veins  PSH Colonoscopy   PCI for repair of \"hole in heart\"  POB G1 23 term  female uncomplicated   G2 current, term  8#11oz male  Meds PNV   Remicade  All Ceclor - hives   Zosyn - myalgia  Soc , nonsmoker, two year old daughter at home.  .    PE /71   Temp 97  F (36.1  C) (Temporal)   Resp 17   SpO2 94%   Breastfeeding Unknown   Gen alert NAD appears comfortable  Abd soft/NT/ND/FF  Bilat labia are symmetric and edematous.  There is an egg size (approx 3 cm wide) hematoma eminating from the R vaginal side wall that is visible when the labia are .  It is soft and " "compressible.  It does extend superiorly along the side wall 4-5 cm.  It is below the urethra.  Extr neg    Given the size and appearance, I recommend we place a melvin catheter as well as vaginal packing overnight.    Verbal consent obtained.  Melvin placed in sterile fashion  Vaginal packing placed alongside the hematoma and packed tightly to the level just above the superior aspect of the hematoma    Lab Results   Component Value Date    WBC 13.1 06/17/2025     Lab Results   Component Value Date    RBC 4.08 06/17/2025     Lab Results   Component Value Date    HGB 12.3 06/17/2025     Lab Results   Component Value Date    HCT 35.8 06/17/2025     No components found for: \"MCT\"  Lab Results   Component Value Date    MCV 88 06/17/2025     Lab Results   Component Value Date    MCH 30.1 06/17/2025     Lab Results   Component Value Date    MCHC 34.4 06/17/2025     Lab Results   Component Value Date    RDW 12.6 06/17/2025     Lab Results   Component Value Date     06/17/2025     Imp: Normal labor and delivery course c/b labial edema and vaginal hematoma, stable per nursing since delivery 4+ hours ago.  Vitals are stable.  Patient comfortable, and tolerated vag packing and melvin placement well.  Mild thrombocytopenia at admission.  Patient denies history of bleeding problems or low platelets previously.  Blood pressures are normal.    Plan:   Repeat labs including platelets, hgb and coags.   Leave vaginal packing and melvin in until morning.   Monitor pain level as well as vital signs.   If further expansion is noted, will need to return to OR for evacuation and packing.      "

## 2025-06-18 NOTE — PLAN OF CARE
Goal Outcome Evaluation:      Plan of Care Reviewed With: patient    Overall Patient Progress: no changeOverall Patient Progress: no change    Vital signs stable and assessments WDL except for vaginal side wall hematoma and labial swelling. Vaginal packing in place and melvin draining good amounts of clear urine. Pain is controlled with tylenol and ibuprofen. Ice packs applied for perineal comfort. Hot packs used for uterine cramping. Encouraged to continue to ambulate as able. Patient is bonding well with infant.  at bedside and attentive to patient and infant.

## 2025-06-18 NOTE — PROGRESS NOTES
Data: Mariaa Mann transferred to room 406 via wheelchair at 2030. Baby transferred via parent's arms.  Action: Receiving unit notified of transfer: Yes. Patient and family notified of room change. Report given to Maria Luisa VOSS RN at 2035. Belongings sent to receiving unit. Accompanied by Registered Nurse. Oriented patient to surroundings. Call light within reach. ID bands double-checked with receiving RN.  Response: Patient tolerated transfer and is stable.

## 2025-06-18 NOTE — PROGRESS NOTES
Postpartum progress note  PPD#1 s/p   Vaginal wall hematoma    S: Pain still pretty ok despite packing in place. Disc removal.  Patient consents to this and removed without difficulty.  As still very swollen near urethra will leave melvin in a few more hours.  Lochia appropriate even with packing in.  Has been able to ambulate.  Working on breastfeeding.  Disc +/-, R/B of discharge today.  Given delivered later may want to consider logistics.  At minimum needs to have melvin out and voiding well prior to discharge.  Can make this decision later today.    O:  Vitals:    25 1915 25 2115 25 2345 25 0335   BP: 132/71 127/80 129/84 109/70   BP Location:  Left arm Left arm Right arm   Patient Position:  Semi-Murdock's Semi-Murdock's Semi-Murdock's   Cuff Size:  Adult Regular Adult Regular Adult Regular   Pulse:  66 76 62   Resp:    Temp:  97.8  F (36.6  C) 97.7  F (36.5  C) 97.8  F (36.6  C)   TempSrc:  Oral Oral Oral   SpO2:         Intake/Output Summary (Last 24 hours) at 2025 0751  Last data filed at 2025 0615  Gross per 24 hour   Intake --   Output 2300 ml   Net -2300 ml     Gen: NAD  Resp: No increased work of breathing  Abd: soft, nontender  : Melvin in place, labia majora superiorly are edematous bilaterally although right > left slightly, packing removed with expected lochia, no apparent expansion of vaginal wall hematoma, tolerated well overall nontender no signs of infection  Ext: warm, well-perfused     Latest Reference Range & Units 25 10:11 25 23:41 25 07:10   Hemoglobin 11.7 - 15.7 g/dL 12.3 12.2 11.6 (L)     A/P: 29 y/o  PPD#1 s/p , course complicated by vaginal hematoma but improving  PPD#1  -Routine care    2. Vaginal wall hematoma  -Packing removed  -Lochia appropriate  -UOP adequate, remove melvin later today with improvement in labial swelling, possible ambulation may help with this  -Hgb this AM expected at 11.6  -No concern for  additional expansion    3. Rh positive/Rubella immune/S/p tdap    4. BCM  -To be discussed at postpartum visit    5. Male infant  -Working on breastfeeding    6. Dispo  -Possible discharge home today pending voiding and if getting late into evening consider discharge tomorrow AM    Electronically signed by:  Brittnee Hartman  [facility]  [facility] Office  Pager: [phone]  June 18, 2025

## 2025-06-19 NOTE — PROVIDER NOTIFICATION
06/18/25 2000   Provider Notification   Provider Name/Title Dr. Hartman   Method of Notification Electronic Page   Request Evaluate-Remote   Notification Reason Status Update     Provider notified that patient wants to discharge this evening.

## 2025-06-19 NOTE — PROVIDER NOTIFICATION
06/18/25 2107   Provider Notification   Provider Name/Title Dr. Hartman   Method of Notification Phone   Request Evaluate-Remote   Notification Reason Other     Provider returned page and will put in orders to discharge patient this evening.

## 2025-06-19 NOTE — ANESTHESIA POSTPROCEDURE EVALUATION
Patient: Mariaa Mann    Procedure: * No procedures listed *       Anesthesia Type:  Epidural    Note:  Disposition: Inpatient   Postop Pain Control: Uneventful            Sign Out: Well controlled pain   PONV:    Neuro/Psych: Uneventful            Sign Out: Acceptable/Baseline neuro status   Airway/Respiratory: Uneventful            Sign Out: Acceptable/Baseline resp. status   CV/Hemodynamics: Uneventful            Sign Out: Acceptable CV status   Other NRE: NONE   DID A NON-ROUTINE EVENT OCCUR?     Event details/Postop Comments:  Patient evaluated after epidural follow-up and specifically denies severe headache, severe lower back pain, saddle anesthesia, loss of bowel/bladder function or other major complications.  Ambulating as expected.             Last vitals:  Vitals:    06/18/25 0335 06/18/25 0752 06/18/25 1544   BP: 109/70 106/68 119/68   Pulse: 62 64 77   Resp: 16 18 18   Temp: 36.6  C (97.8  F) 36.7  C (98  F) 36.8  C (98.2  F)   SpO2:          Electronically Signed By: Bridger Hudson MD  June 18, 2025  8:37 PM

## 2025-06-19 NOTE — PLAN OF CARE
Goal Outcome Evaluation:      Plan of Care Reviewed With: patient    Overall Patient Progress: improvingOverall Patient Progress: improving    VSS and assessments WDL besides swollen labia and vaginal hematoma. Patient rates perineal pain 2/10 and is taking tylenol and ibuprofen as needed. Using ice packs and tucks for perineal comfort. Pt. received complete discharge paperwork and was given times of last dose for all discharge medications in writing on discharge medication sheets. Discharge teaching included home medication, pain management, activity restrictions, postpartum cares, and signs and symptoms of infection.  Discharge outcomes on care plan met.  Mother states understanding and comfort with self care and follow up care. Patient discharged accompanied by  and baby boy. Patient left with personal belongings. Patient encouraged to follow up with OB provider per discharge instructions.  Pt. had no further questions at the time of discharge and no unmet needs were identified.

## 2025-06-19 NOTE — DISCHARGE SUMMARY
[facility] Discharge Summary    Mariaa Mann MRN# [id number]   Age: 30 year old YOB: 1995     Date of Admission:  2025  Date of Discharge::  2025 10:14 PM  Admitting Physician:  Brittnee De Leon MD  Discharge Physician:  Brittnee Hartman MD     Home clinic: [facility]          Admission Diagnoses:   Labor at 40+5          Discharge Diagnosis:     Normal spontaneous vaginal delivery  Vaginal hematoma, s/p packing and stable          Procedures:     Procedure(s):        Vaginal packing post delivery           Medications Prior to Admission:     No medications prior to admission.             Discharge Medications:     Discharge Medication List as of 2025  9:41 PM        CONTINUE these medications which have NOT CHANGED    Details   acetaminophen (TYLENOL) 500 MG tablet Take 2 tablets (1,000 mg) by mouth every 6 hours as needed for mild pain or fever (greater than or equal to 38  C /100.4  F (oral) or 38.5  C/ 101.4  F (core).), OTC      ibuprofen (ADVIL/MOTRIN) 200 MG tablet Take 3-4 tablets (600-800 mg) by mouth every 6 hours as needed for other (cramping), OTC      inFLIXimab (REMICADE) 100 MG injection once Last administered at 32 weeks of pregnancy, Historical      Prenatal Vit-Fe Fumarate-FA (PRENATAL MULTIVITAMIN W/IRON) 27-0.8 MG tablet Take by mouth daily, Historical                   Consultations:   No consultations were requested during this admission          Brief History of Labor:     Mariaa was admitted in labor and progressed normally.  She received an epidural, SROM occurred. She progressed to complete.  She pushed for 90 minutes.  See delivery not for details.  Post delivery there was concern for a vaginal hematoma.  Gold and vaginal packing were placed until PPD#1.           Hospital Course:   The vaginal packing was removed on PPD#1 with no signs of expansion of hematoma, normal lochia and minimal pain.  Hgb was stable.   Gold was removed later in the day after labial swelling decreased.  On discharge, her pain was well controlled. Vaginal bleeding is similar to peak menstrual flow.  Voiding without difficulty.  Ambulating well and tolerating a normal diet.  No fever.  Breastfeeding well.  Infant is stable.  She was discharged on post-partum day #1.    Post-partum hemoglobin:   Hemoglobin   Date Value Ref Range Status   06/18/2025 11.6 (L) 11.7 - 15.7 g/dL Final             Discharge Instructions and Follow-Up:     Discharge diet: Regular   Discharge activity: Pelvic rest: abstain from intercourse and do not use tampons for 6 week(s)   Discharge follow-up: Follow up with Dr. Penn in 2 and 6 weeks   Wound care: Tub soaks           Discharge Disposition:     Discharged to home      Attestation:  Total time: 40 minutes    Brittnee Hartman MD   Electronically signed by:  Brittnee Hartman  [facility] Office  Pager: [phone]  June 19, 2025

## 2025-07-19 ENCOUNTER — HEALTH MAINTENANCE LETTER (OUTPATIENT)
Age: 30
End: 2025-07-19